# Patient Record
Sex: MALE | Race: BLACK OR AFRICAN AMERICAN | URBAN - METROPOLITAN AREA
[De-identification: names, ages, dates, MRNs, and addresses within clinical notes are randomized per-mention and may not be internally consistent; named-entity substitution may affect disease eponyms.]

---

## 2020-10-07 ENCOUNTER — OFFICE VISIT (OUTPATIENT)
Dept: URBAN - METROPOLITAN AREA CLINIC 105 | Facility: CLINIC | Age: 40
End: 2020-10-07
Payer: COMMERCIAL

## 2020-10-07 DIAGNOSIS — R56.9 SEIZURE: ICD-10-CM

## 2020-10-07 DIAGNOSIS — Z79.01 ANTICOAGULATION ADEQUATE: ICD-10-CM

## 2020-10-07 DIAGNOSIS — D12.6 COLON ADENOMA: ICD-10-CM

## 2020-10-07 DIAGNOSIS — R19.4 CHANGE IN BOWEL HABIT: ICD-10-CM

## 2020-10-07 DIAGNOSIS — K58.1 IRRITABLE BOWEL SYNDROME WITH CONSTIPATION: ICD-10-CM

## 2020-10-07 DIAGNOSIS — R10.9 ABDOMINAL CRAMPING: ICD-10-CM

## 2020-10-07 DIAGNOSIS — E75.21: ICD-10-CM

## 2020-10-07 DIAGNOSIS — Z80.0 FAMILY HISTORY OF COLON CANCER: ICD-10-CM

## 2020-10-07 DIAGNOSIS — E13.9 DIABETES 1.5, MANAGED AS TYPE 2: ICD-10-CM

## 2020-10-07 DIAGNOSIS — K62.5 RECTAL BLEEDING: ICD-10-CM

## 2020-10-07 PROCEDURE — G8417 CALC BMI ABV UP PARAM F/U: HCPCS | Performed by: INTERNAL MEDICINE

## 2020-10-07 PROCEDURE — 1036F TOBACCO NON-USER: CPT | Performed by: INTERNAL MEDICINE

## 2020-10-07 PROCEDURE — 99214 OFFICE O/P EST MOD 30 MIN: CPT | Performed by: INTERNAL MEDICINE

## 2020-10-07 PROCEDURE — G8482 FLU IMMUNIZE ORDER/ADMIN: HCPCS | Performed by: INTERNAL MEDICINE

## 2020-10-07 PROCEDURE — G8430 EC AT DOC MEDREC PT NOT ELIG: HCPCS | Performed by: INTERNAL MEDICINE

## 2020-10-07 RX ORDER — WARFARIN 5 MG/1
TABLET ORAL
Qty: 0 | Refills: 0 | Status: ACTIVE | COMMUNITY
Start: 1900-01-01

## 2020-10-07 RX ORDER — DICYCLOMINE HYDROCHLORIDE 10 MG/1
TAKE 1 CAPSULE BY ORAL ROUTE 3 TIMES A DAY AS NEEDED FOR 30 DAYS CAPSULE ORAL
Qty: 30 | Refills: 0
Start: 2020-05-07

## 2020-10-07 RX ORDER — INSULIN DETEMIR 100 [IU]/ML
INJECT BY SUBCUTANEOUS ROUTE PER PRESCRIBER'S INSTRUCTIONS. INSULIN DOSING REQUIRES INDIVIDUALIZATION INJECTION, SOLUTION SUBCUTANEOUS
Qty: 1 | Refills: 0 | Status: ACTIVE | COMMUNITY
Start: 1900-01-01

## 2020-10-07 RX ORDER — LACOSAMIDE 200 MG/1
TAKE 1 TABLET (200 MG) BY ORAL ROUTE 2 TIMES PER DAY TABLET, FILM COATED ORAL 2
Qty: 0 | Refills: 0 | Status: ACTIVE | COMMUNITY
Start: 1900-01-01

## 2020-10-07 RX ORDER — LORAZEPAM 1 MG/1
TABLET ORAL
Qty: 0 | Refills: 0 | Status: ACTIVE | COMMUNITY
Start: 1900-01-01

## 2020-10-07 RX ORDER — INSULIN ASPART 100 [IU]/ML
INJECT BY SUBCUTANEOUS ROUTE PER PRESCRIBER'S INSTRUCTIONS. INSULIN DOSING REQUIRES INDIVIDUALIZATION INJECTION, SOLUTION INTRAVENOUS; SUBCUTANEOUS
Qty: 1 | Refills: 0 | Status: ACTIVE | COMMUNITY
Start: 1900-01-01

## 2020-10-07 RX ORDER — LUBIPROSTONE 24 UG/1
1 CAPSULE WITH FOOD AND WATER CAPSULE, GELATIN COATED ORAL TWICE A DAY
Qty: 180 CAPSULE | Refills: 0 | OUTPATIENT
Start: 2020-10-07 | End: 2021-01-04

## 2020-10-07 RX ORDER — SULFAMETHOXAZOLE AND TRIMETHOPRIM 200; 40 MG/5ML; MG/5ML
SUSPENSION ORAL
Qty: 0 | Refills: 0 | Status: ACTIVE | COMMUNITY
Start: 1900-01-01

## 2020-10-07 RX ORDER — PREGABALIN 75 MG/1
CAPSULE ORAL
Qty: 0 | Refills: 0 | Status: ACTIVE | COMMUNITY
Start: 1900-01-01

## 2020-10-07 NOTE — HPI-OTHER HISTORIES
40 yo male pt of DR Landrum. Discussed his medical history in detail "Amparo always been healthy and work out regularly". 5 years ago was in and out of hospital for 2 years and he was diagnosed with all the below in [facility]. He was diagnosed with DM in 2016. His last hbaic was 6.1 last week. He is type 2 diabetic. He is for hip surgery in May and had another hip surgery in 1/2020. He is seeing me today for "stomach issues" on going for the past 3 years. He initially thought it was a part of his diabetes. A few weeks ago, he felt his symptoms had become unbearable. Symptoms are worse in am and at night. In the am, he gets severe cramps in the am and pm/ Because of that he gets hypoglycemic. Says sometimes eating helps it in the evening. But from 6 am to 10.30 am it is severe, he can be hunched over the office in the am. Cramping is mid abdominal. He had an appendectomy 6 years ago and had no problems with that. Cramping wakes him up from sleep in the am. He gets relief from abdominal cramping after passing urine which he says he does frequently "3 times an hour". AFter eating, he usually will have a BM. In the past (until 2015) he had a BM once every 2 weeks. and on a good week 3 times a week. Now he has a BM 3 times a day for the past 5 years. Stools are not hard. Least number of BMs is one a day. There is no blood in stool and no fhx of colon CA. He does not use nsaids. He has determined that cramps have nothing to do with BG levels either high or low.  5/7/20 STill having severe abdominal pain. Wife present today. clarifies today to saw he has had blood in stool. He thinks from straining as "I split my butt crack". First time was a few years ago. Occurence once every couple of months. kUB only showing mild fecal loading. He notes a loosening in stool consistency in the last month. He states he has been checked for a UTI multiple times. Says he has had an elevated wbc "they have never been able to find a cause". He has seen hematology and told nothing to be concerned about. He says it has gone from high to moderate as at last draw. wbc 11/2019 is 6. He is taking pantoprazole "that makes it worse".  5/28/20 Says "I feel great!" SAys as soon as his colonoscopy, he felt great and that his stomach was flat. "it has been protruding for 3 years". Discussed CT/EGD/colonoscopy . 10/7/20 Not doing well " my symptoms are back to where they were before the colonoscopy" He is going through a divorce. Has 2 kids.  Is having a BM only twice a week. Abdominal cramps. "miralax is not working". Dicyclomine helps with cramping.

## 2021-01-08 ENCOUNTER — ERX REFILL RESPONSE (OUTPATIENT)
Dept: URBAN - METROPOLITAN AREA CLINIC 105 | Facility: CLINIC | Age: 41
End: 2021-01-08

## 2021-01-08 RX ORDER — LUBIPROSTONE 24 UG/1
1 CAPSULE WITH FOOD AND WATER CAPSULE, GELATIN COATED ORAL TWICE A DAY
Qty: 180 | Refills: 0

## 2021-01-27 ENCOUNTER — ERX REFILL RESPONSE (OUTPATIENT)
Dept: URBAN - METROPOLITAN AREA CLINIC 105 | Facility: CLINIC | Age: 41
End: 2021-01-27

## 2021-01-27 RX ORDER — LUBIPROSTONE 24 UG/1
1 CAPSULE WITH FOOD AND WATER CAPSULE, GELATIN COATED ORAL TWICE A DAY
Qty: 180 | Refills: 0

## 2021-02-03 ENCOUNTER — LAB OUTSIDE AN ENCOUNTER (OUTPATIENT)
Dept: URBAN - METROPOLITAN AREA CLINIC 105 | Facility: CLINIC | Age: 41
End: 2021-02-03

## 2021-02-03 ENCOUNTER — OFFICE VISIT (OUTPATIENT)
Dept: URBAN - METROPOLITAN AREA CLINIC 105 | Facility: CLINIC | Age: 41
End: 2021-02-03
Payer: COMMERCIAL

## 2021-02-03 DIAGNOSIS — E75.21: ICD-10-CM

## 2021-02-03 DIAGNOSIS — D12.6 COLON ADENOMA: ICD-10-CM

## 2021-02-03 DIAGNOSIS — K62.5 RECTAL BLEEDING: ICD-10-CM

## 2021-02-03 DIAGNOSIS — R10.9 ABDOMINAL CRAMPING: ICD-10-CM

## 2021-02-03 DIAGNOSIS — R56.9 SEIZURE: ICD-10-CM

## 2021-02-03 DIAGNOSIS — Z80.0 FAMILY HISTORY OF COLON CANCER: ICD-10-CM

## 2021-02-03 DIAGNOSIS — K58.1 IRRITABLE BOWEL SYNDROME WITH CONSTIPATION: ICD-10-CM

## 2021-02-03 DIAGNOSIS — E13.9 DIABETES 1.5, MANAGED AS TYPE 2: ICD-10-CM

## 2021-02-03 DIAGNOSIS — R19.4 CHANGE IN BOWEL HABIT: ICD-10-CM

## 2021-02-03 DIAGNOSIS — Z79.01 ANTICOAGULATION ADEQUATE: ICD-10-CM

## 2021-02-03 PROCEDURE — G8427 DOCREV CUR MEDS BY ELIG CLIN: HCPCS | Performed by: INTERNAL MEDICINE

## 2021-02-03 PROCEDURE — G8420 CALC BMI NORM PARAMETERS: HCPCS | Performed by: INTERNAL MEDICINE

## 2021-02-03 PROCEDURE — G8482 FLU IMMUNIZE ORDER/ADMIN: HCPCS | Performed by: INTERNAL MEDICINE

## 2021-02-03 PROCEDURE — 4004F PT TOBACCO SCREEN RCVD TLK: CPT | Performed by: INTERNAL MEDICINE

## 2021-02-03 PROCEDURE — 99214 OFFICE O/P EST MOD 30 MIN: CPT | Performed by: INTERNAL MEDICINE

## 2021-02-03 RX ORDER — SULFAMETHOXAZOLE AND TRIMETHOPRIM 200; 40 MG/5ML; MG/5ML
SUSPENSION ORAL
Qty: 0 | Refills: 0 | Status: ACTIVE | COMMUNITY
Start: 1900-01-01

## 2021-02-03 RX ORDER — INSULIN ASPART 100 [IU]/ML
INJECT BY SUBCUTANEOUS ROUTE PER PRESCRIBER'S INSTRUCTIONS. INSULIN DOSING REQUIRES INDIVIDUALIZATION INJECTION, SOLUTION INTRAVENOUS; SUBCUTANEOUS
Qty: 1 | Refills: 0 | Status: ACTIVE | COMMUNITY
Start: 1900-01-01

## 2021-02-03 RX ORDER — LUBIPROSTONE 24 UG/1
1 CAPSULE WITH FOOD AND WATER CAPSULE, GELATIN COATED ORAL TWICE A DAY
Qty: 180

## 2021-02-03 RX ORDER — LUBIPROSTONE 24 UG/1
1 CAPSULE WITH FOOD AND WATER CAPSULE, GELATIN COATED ORAL TWICE A DAY
Qty: 180 | Refills: 0 | Status: ACTIVE | COMMUNITY

## 2021-02-03 RX ORDER — LACOSAMIDE 200 MG/1
TAKE 1 TABLET (200 MG) BY ORAL ROUTE 2 TIMES PER DAY TABLET, FILM COATED ORAL 2
Qty: 0 | Refills: 0 | Status: ACTIVE | COMMUNITY
Start: 1900-01-01

## 2021-02-03 RX ORDER — LORAZEPAM 1 MG/1
TABLET ORAL
Qty: 0 | Refills: 0 | Status: ACTIVE | COMMUNITY
Start: 1900-01-01

## 2021-02-03 RX ORDER — WARFARIN 5 MG/1
TABLET ORAL
Qty: 0 | Refills: 0 | Status: ACTIVE | COMMUNITY
Start: 1900-01-01

## 2021-02-03 RX ORDER — DICYCLOMINE HYDROCHLORIDE 10 MG/1
TAKE 1 CAPSULE BY ORAL ROUTE 3 TIMES A DAY AS NEEDED FOR 30 DAYS CAPSULE ORAL
Qty: 30 | Refills: 0 | Status: ACTIVE | COMMUNITY
Start: 2020-05-07

## 2021-02-03 RX ORDER — PREGABALIN 75 MG/1
CAPSULE ORAL
Qty: 0 | Refills: 0 | Status: ACTIVE | COMMUNITY
Start: 1900-01-01

## 2021-02-03 RX ORDER — DICYCLOMINE HYDROCHLORIDE 10 MG/1
TAKE 1 CAPSULE BY ORAL ROUTE 3 TIMES A DAY AS NEEDED FOR 30 DAYS CAPSULE ORAL
Qty: 30 | Refills: 0

## 2021-02-03 RX ORDER — INSULIN DETEMIR 100 [IU]/ML
INJECT BY SUBCUTANEOUS ROUTE PER PRESCRIBER'S INSTRUCTIONS. INSULIN DOSING REQUIRES INDIVIDUALIZATION INJECTION, SOLUTION SUBCUTANEOUS
Qty: 1 | Refills: 0 | Status: ACTIVE | COMMUNITY
Start: 1900-01-01

## 2021-02-03 NOTE — HPI-OTHER HISTORIES
40 yo male pt of DR Landrum. Discussed his medical history in detail "Amparo always been healthy and work out regularly". 5 years ago was in and out of hospital for 2 years and he was diagnosed with all the below in [facility]. He was diagnosed with DM in 2016. His last hbaic was 6.1 last week. He is type 2 diabetic. He is for hip surgery in May and had another hip surgery in 1/2020. He is seeing me today for "stomach issues" on going for the past 3 years. He initially thought it was a part of his diabetes. A few weeks ago, he felt his symptoms had become unbearable. Symptoms are worse in am and at night. In the am, he gets severe cramps in the am and pm/ Because of that he gets hypoglycemic. Says sometimes eating helps it in the evening. But from 6 am to 10.30 am it is severe, he can be hunched over the office in the am. Cramping is mid abdominal. He had an appendectomy 6 years ago and had no problems with that. Cramping wakes him up from sleep in the am. He gets relief from abdominal cramping after passing urine which he says he does frequently "3 times an hour". AFter eating, he usually will have a BM. In the past (until 2015) he had a BM once every 2 weeks. and on a good week 3 times a week. Now he has a BM 3 times a day for the past 5 years. Stools are not hard. Least number of BMs is one a day. There is no blood in stool and no fhx of colon CA. He does not use nsaids. He has determined that cramps have nothing to do with BG levels either high or low.  5/7/20 STill having severe abdominal pain. Wife present today. clarifies today to saw he has had blood in stool. He thinks from straining as "I split my butt crack". First time was a few years ago. Occurence once every couple of months. kUB only showing mild fecal loading. He notes a loosening in stool consistency in the last month. He states he has been checked for a UTI multiple times. Says he has had an elevated wbc "they have never been able to find a cause". He has seen hematology and told nothing to be concerned about. He says it has gone from high to moderate as at last draw. wbc 11/2019 is 6. He is taking pantoprazole "that makes it worse".  5/28/20 Says "I feel great!" SAys as soon as his colonoscopy, he felt great and that his stomach was flat. "it has been protruding for 3 years". Discussed CT/EGD/colonoscopy . 10/7/20 Not doing well " my symptoms are back to where they were before the colonoscopy" He is going through a divorce. Has 2 kids.  Is having a BM only twice a week. Abdominal cramps. "miralax is not working". Dicyclomine helps with cramping.  2/3/21 Says Amidia was working bid. Says the first week it gave him diarrhed and cramps. Then that resolved and he was having a BM every morning "like clockwork". Now he is back to where he is still having a BM daily but is still gettting stomach cramps. He is taking dicyclomine about qod. This helps the cramping but for a while he was off dicyclomine.  His father has been in hospital now for the past month - first prostate, then colon CA and covid. This has been very stressful

## 2021-02-19 ENCOUNTER — TELEPHONE ENCOUNTER (OUTPATIENT)
Dept: URBAN - METROPOLITAN AREA CLINIC 105 | Facility: CLINIC | Age: 41
End: 2021-02-19

## 2021-03-05 ENCOUNTER — TELEPHONE ENCOUNTER (OUTPATIENT)
Dept: URBAN - METROPOLITAN AREA CLINIC 92 | Facility: CLINIC | Age: 41
End: 2021-03-05

## 2021-03-15 ENCOUNTER — OFFICE VISIT (OUTPATIENT)
Dept: URBAN - METROPOLITAN AREA CLINIC 105 | Facility: CLINIC | Age: 41
End: 2021-03-15
Payer: COMMERCIAL

## 2021-03-15 ENCOUNTER — WEB ENCOUNTER (OUTPATIENT)
Dept: URBAN - METROPOLITAN AREA CLINIC 105 | Facility: CLINIC | Age: 41
End: 2021-03-15

## 2021-03-15 DIAGNOSIS — D12.6 COLON ADENOMA: ICD-10-CM

## 2021-03-15 DIAGNOSIS — K58.1 IRRITABLE BOWEL SYNDROME WITH CONSTIPATION: ICD-10-CM

## 2021-03-15 DIAGNOSIS — E75.21: ICD-10-CM

## 2021-03-15 DIAGNOSIS — R19.4 CHANGE IN BOWEL HABIT: ICD-10-CM

## 2021-03-15 DIAGNOSIS — K62.5 RECTAL BLEEDING: ICD-10-CM

## 2021-03-15 DIAGNOSIS — R56.9 SEIZURE: ICD-10-CM

## 2021-03-15 DIAGNOSIS — E13.9 DIABETES 1.5, MANAGED AS TYPE 2: ICD-10-CM

## 2021-03-15 DIAGNOSIS — Z80.0 FAMILY HISTORY OF COLON CANCER: ICD-10-CM

## 2021-03-15 DIAGNOSIS — Z79.01 ANTICOAGULATION ADEQUATE: ICD-10-CM

## 2021-03-15 DIAGNOSIS — R10.9 ABDOMINAL CRAMPING: ICD-10-CM

## 2021-03-15 PROCEDURE — 99214 OFFICE O/P EST MOD 30 MIN: CPT | Performed by: INTERNAL MEDICINE

## 2021-03-15 RX ORDER — INSULIN ASPART 100 [IU]/ML
INJECT BY SUBCUTANEOUS ROUTE PER PRESCRIBER'S INSTRUCTIONS. INSULIN DOSING REQUIRES INDIVIDUALIZATION INJECTION, SOLUTION INTRAVENOUS; SUBCUTANEOUS
Qty: 1 | Refills: 0 | Status: ACTIVE | COMMUNITY
Start: 1900-01-01

## 2021-03-15 RX ORDER — INSULIN DETEMIR 100 [IU]/ML
INJECT BY SUBCUTANEOUS ROUTE PER PRESCRIBER'S INSTRUCTIONS. INSULIN DOSING REQUIRES INDIVIDUALIZATION INJECTION, SOLUTION SUBCUTANEOUS
Qty: 1 | Refills: 0 | Status: ACTIVE | COMMUNITY
Start: 1900-01-01

## 2021-03-15 RX ORDER — DICYCLOMINE HYDROCHLORIDE 10 MG/1
TAKE 1 CAPSULE BY ORAL ROUTE 3 TIMES A DAY AS NEEDED FOR 30 DAYS CAPSULE ORAL
Qty: 30 | Refills: 0 | Status: ACTIVE | COMMUNITY

## 2021-03-15 RX ORDER — LUBIPROSTONE 24 UG/1
1 CAPSULE WITH FOOD AND WATER CAPSULE, GELATIN COATED ORAL TWICE A DAY
Qty: 180

## 2021-03-15 RX ORDER — LORAZEPAM 1 MG/1
TABLET ORAL
Qty: 0 | Refills: 0 | Status: ACTIVE | COMMUNITY
Start: 1900-01-01

## 2021-03-15 RX ORDER — PREGABALIN 75 MG/1
CAPSULE ORAL
Qty: 0 | Refills: 0 | Status: ACTIVE | COMMUNITY
Start: 1900-01-01

## 2021-03-15 RX ORDER — WARFARIN 5 MG/1
TABLET ORAL
Qty: 0 | Refills: 0 | Status: ACTIVE | COMMUNITY
Start: 1900-01-01

## 2021-03-15 RX ORDER — DICYCLOMINE HYDROCHLORIDE 10 MG/1
TAKE 1 CAPSULE BY ORAL ROUTE 3 TIMES A DAY AS NEEDED FOR 30 DAYS CAPSULE ORAL
Qty: 30 | Refills: 0

## 2021-03-15 RX ORDER — SULFAMETHOXAZOLE AND TRIMETHOPRIM 200; 40 MG/5ML; MG/5ML
SUSPENSION ORAL
Qty: 0 | Refills: 0 | Status: ACTIVE | COMMUNITY
Start: 1900-01-01

## 2021-03-15 RX ORDER — LACOSAMIDE 200 MG/1
TAKE 1 TABLET (200 MG) BY ORAL ROUTE 2 TIMES PER DAY TABLET, FILM COATED ORAL 2
Qty: 0 | Refills: 0 | Status: ACTIVE | COMMUNITY
Start: 1900-01-01

## 2021-03-15 RX ORDER — LUBIPROSTONE 24 UG/1
1 CAPSULE WITH FOOD AND WATER CAPSULE, GELATIN COATED ORAL TWICE A DAY
Qty: 180 | Status: ACTIVE | COMMUNITY

## 2021-03-15 NOTE — HPI-OTHER HISTORIES
38 yo male pt of DR Landrum. Discussed his medical history in detail "Ampaor always been healthy and work out regularly". 5 years ago was in and out of hospital for 2 years and he was diagnosed with all the below in [facility]. He was diagnosed with DM in 2016. His last hbaic was 6.1 last week. He is type 2 diabetic. He is for hip surgery in May and had another hip surgery in 1/2020. He is seeing me today for "stomach issues" on going for the past 3 years. He initially thought it was a part of his diabetes. A few weeks ago, he felt his symptoms had become unbearable. Symptoms are worse in am and at night. In the am, he gets severe cramps in the am and pm/ Because of that he gets hypoglycemic. Says sometimes eating helps it in the evening. But from 6 am to 10.30 am it is severe, he can be hunched over the office in the am. Cramping is mid abdominal. He had an appendectomy 6 years ago and had no problems with that. Cramping wakes him up from sleep in the am. He gets relief from abdominal cramping after passing urine which he says he does frequently "3 times an hour". AFter eating, he usually will have a BM. In the past (until 2015) he had a BM once every 2 weeks. and on a good week 3 times a week. Now he has a BM 3 times a day for the past 5 years. Stools are not hard. Least number of BMs is one a day. There is no blood in stool and no fhx of colon CA. He does not use nsaids. He has determined that cramps have nothing to do with BG levels either high or low.  5/7/20 STill having severe abdominal pain. Wife present today. clarifies today to saw he has had blood in stool. He thinks from straining as "I split my butt crack". First time was a few years ago. Occurence once every couple of months. kUB only showing mild fecal loading. He notes a loosening in stool consistency in the last month. He states he has been checked for a UTI multiple times. Says he has had an elevated wbc "they have never been able to find a cause". He has seen hematology and told nothing to be concerned about. He says it has gone from high to moderate as at last draw. wbc 11/2019 is 6. He is taking pantoprazole "that makes it worse".  5/28/20 Says "I feel great!" SAys as soon as his colonoscopy, he felt great and that his stomach was flat. "it has been protruding for 3 years". Discussed CT/EGD/colonoscopy . 10/7/20 Not doing well " my symptoms are back to where they were before the colonoscopy" He is going through a divorce. Has 2 kids.  Is having a BM only twice a week. Abdominal cramps. "miralax is not working". Dicyclomine helps with cramping.  2/3/21 Says Amitiza was working bid. Says the first week it gave him diarrhed and cramps. Then that resolved and he was having a BM every morning "like clockwork". Now he is back to where he is still having a BM daily but is still gettting stomach cramps. He is taking dicyclomine about qod. This helps the cramping but for a while he was off dicyclomine.  His father has been in hospital now for the past month - first prostate, then colon CA and covid. This has been very stressful  3/15/21 KUB 2/2021 showed mild to moderate stool in colon while on Amitiza . Started on LInzess 145 "up and down".  Says he didnt have a  BM for 2 days then yesterday "spent 2 hours in the bathroom". When he has a  BM "its like diarrhea'. Says "I dont mind that because I can empty out". Wants to continue to try linzess for now.. He still takes dicyclomine prn for cramping.  STates as a child and over 20 yrs had a  BM once a week or once every 2 weeks.

## 2021-04-13 ENCOUNTER — TELEPHONE ENCOUNTER (OUTPATIENT)
Dept: URBAN - METROPOLITAN AREA CLINIC 92 | Facility: CLINIC | Age: 41
End: 2021-04-13

## 2021-04-13 RX ORDER — LINACLOTIDE 145 UG/1
1 CAPSULE AT LEAST 30 MINUTES BEFORE THE FIRST MEAL OF THE DAY ON AN EMPTY STOMACH CAPSULE, GELATIN COATED ORAL ONCE A DAY
Qty: 90 | Refills: 0 | OUTPATIENT
Start: 2021-04-14 | End: 2021-07-13

## 2021-04-13 RX ORDER — INSULIN ASPART 100 [IU]/ML
INJECT BY SUBCUTANEOUS ROUTE PER PRESCRIBER'S INSTRUCTIONS. INSULIN DOSING REQUIRES INDIVIDUALIZATION INJECTION, SOLUTION INTRAVENOUS; SUBCUTANEOUS
Qty: 1 | Refills: 0 | Status: ACTIVE | COMMUNITY
Start: 1900-01-01

## 2021-04-13 RX ORDER — LORAZEPAM 1 MG/1
TABLET ORAL
Qty: 0 | Refills: 0 | Status: ACTIVE | COMMUNITY
Start: 1900-01-01

## 2021-04-13 RX ORDER — WARFARIN 5 MG/1
TABLET ORAL
Qty: 0 | Refills: 0 | Status: ACTIVE | COMMUNITY
Start: 1900-01-01

## 2021-04-13 RX ORDER — INSULIN DETEMIR 100 [IU]/ML
INJECT BY SUBCUTANEOUS ROUTE PER PRESCRIBER'S INSTRUCTIONS. INSULIN DOSING REQUIRES INDIVIDUALIZATION INJECTION, SOLUTION SUBCUTANEOUS
Qty: 1 | Refills: 0 | Status: ACTIVE | COMMUNITY
Start: 1900-01-01

## 2021-04-13 RX ORDER — DICYCLOMINE HYDROCHLORIDE 10 MG/1
TAKE 1 CAPSULE BY ORAL ROUTE 3 TIMES A DAY AS NEEDED FOR 30 DAYS CAPSULE ORAL
Qty: 30 | Refills: 0 | Status: ACTIVE | COMMUNITY

## 2021-04-13 RX ORDER — SULFAMETHOXAZOLE AND TRIMETHOPRIM 200; 40 MG/5ML; MG/5ML
SUSPENSION ORAL
Qty: 0 | Refills: 0 | Status: ACTIVE | COMMUNITY
Start: 1900-01-01

## 2021-04-13 RX ORDER — LUBIPROSTONE 24 UG/1
1 CAPSULE WITH FOOD AND WATER CAPSULE, GELATIN COATED ORAL TWICE A DAY
Qty: 180 | Status: ACTIVE | COMMUNITY

## 2021-04-13 RX ORDER — LACOSAMIDE 200 MG/1
TAKE 1 TABLET (200 MG) BY ORAL ROUTE 2 TIMES PER DAY TABLET, FILM COATED ORAL 2
Qty: 0 | Refills: 0 | Status: ACTIVE | COMMUNITY
Start: 1900-01-01

## 2021-04-13 RX ORDER — PREGABALIN 75 MG/1
CAPSULE ORAL
Qty: 0 | Refills: 0 | Status: ACTIVE | COMMUNITY
Start: 1900-01-01

## 2021-05-03 ENCOUNTER — OFFICE VISIT (OUTPATIENT)
Dept: URBAN - METROPOLITAN AREA CLINIC 105 | Facility: CLINIC | Age: 41
End: 2021-05-03

## 2022-01-06 ENCOUNTER — TELEPHONE ENCOUNTER (OUTPATIENT)
Dept: URBAN - METROPOLITAN AREA CLINIC 105 | Facility: CLINIC | Age: 42
End: 2022-01-06

## 2022-01-26 ENCOUNTER — OFFICE VISIT (OUTPATIENT)
Dept: URBAN - METROPOLITAN AREA CLINIC 105 | Facility: CLINIC | Age: 42
End: 2022-01-26
Payer: COMMERCIAL

## 2022-01-26 ENCOUNTER — LAB OUTSIDE AN ENCOUNTER (OUTPATIENT)
Dept: URBAN - METROPOLITAN AREA CLINIC 105 | Facility: CLINIC | Age: 42
End: 2022-01-26

## 2022-01-26 VITALS
SYSTOLIC BLOOD PRESSURE: 116 MMHG | TEMPERATURE: 96.8 F | HEIGHT: 71 IN | WEIGHT: 202.4 LBS | HEART RATE: 75 BPM | DIASTOLIC BLOOD PRESSURE: 81 MMHG | BODY MASS INDEX: 28.34 KG/M2

## 2022-01-26 DIAGNOSIS — R10.9 ABDOMINAL CRAMPING: ICD-10-CM

## 2022-01-26 DIAGNOSIS — I25.2 STATUS POST MYOCARDIAL INFARCTION: ICD-10-CM

## 2022-01-26 DIAGNOSIS — Z80.0 FAMILY HISTORY OF COLON CANCER: ICD-10-CM

## 2022-01-26 DIAGNOSIS — K62.5 RECTAL BLEEDING: ICD-10-CM

## 2022-01-26 DIAGNOSIS — E13.9 DIABETES 1.5, MANAGED AS TYPE 2: ICD-10-CM

## 2022-01-26 DIAGNOSIS — Z79.01 ANTICOAGULATION ADEQUATE: ICD-10-CM

## 2022-01-26 DIAGNOSIS — E75.21: ICD-10-CM

## 2022-01-26 DIAGNOSIS — K58.1 IRRITABLE BOWEL SYNDROME WITH CONSTIPATION: ICD-10-CM

## 2022-01-26 DIAGNOSIS — R19.4 CHANGE IN BOWEL HABIT: ICD-10-CM

## 2022-01-26 DIAGNOSIS — R56.9 SEIZURE: ICD-10-CM

## 2022-01-26 DIAGNOSIS — D12.6 COLON ADENOMA: ICD-10-CM

## 2022-01-26 PROCEDURE — 99213 OFFICE O/P EST LOW 20 MIN: CPT | Performed by: INTERNAL MEDICINE

## 2022-01-26 RX ORDER — LACOSAMIDE 200 MG/1
TAKE 1 TABLET (200 MG) BY ORAL ROUTE 2 TIMES PER DAY TABLET, FILM COATED ORAL 2
Qty: 0 | Refills: 0 | Status: ACTIVE | COMMUNITY
Start: 1900-01-01

## 2022-01-26 RX ORDER — LACTULOSE 10 G/15ML
15 ML SOLUTION ORAL ONCE A DAY
Qty: 1350 ML | Refills: 0 | OUTPATIENT
Start: 2022-01-26 | End: 2022-04-26

## 2022-01-26 RX ORDER — LUBIPROSTONE 24 UG/1
1 CAPSULE WITH FOOD AND WATER CAPSULE, GELATIN COATED ORAL TWICE A DAY
Qty: 180 | Status: ACTIVE | COMMUNITY

## 2022-01-26 RX ORDER — DICYCLOMINE HYDROCHLORIDE 10 MG/1
TAKE 1 CAPSULE BY ORAL ROUTE 3 TIMES A DAY AS NEEDED FOR 30 DAYS CAPSULE ORAL
Qty: 270 | Refills: 0

## 2022-01-26 RX ORDER — LORAZEPAM 1 MG/1
TABLET ORAL
Qty: 0 | Refills: 0 | Status: ACTIVE | COMMUNITY
Start: 1900-01-01

## 2022-01-26 RX ORDER — SULFAMETHOXAZOLE AND TRIMETHOPRIM 200; 40 MG/5ML; MG/5ML
SUSPENSION ORAL
Qty: 0 | Refills: 0 | Status: ACTIVE | COMMUNITY
Start: 1900-01-01

## 2022-01-26 RX ORDER — PREGABALIN 75 MG/1
CAPSULE ORAL
Qty: 0 | Refills: 0 | Status: ACTIVE | COMMUNITY
Start: 1900-01-01

## 2022-01-26 RX ORDER — WARFARIN 5 MG/1
TABLET ORAL
Qty: 0 | Refills: 0 | Status: ACTIVE | COMMUNITY
Start: 1900-01-01

## 2022-01-26 RX ORDER — DICYCLOMINE HYDROCHLORIDE 10 MG/1
TAKE 1 CAPSULE BY ORAL ROUTE 3 TIMES A DAY AS NEEDED FOR 30 DAYS CAPSULE ORAL
Qty: 30 | Refills: 0 | Status: ACTIVE | COMMUNITY

## 2022-01-26 RX ORDER — INSULIN DETEMIR 100 [IU]/ML
INJECT BY SUBCUTANEOUS ROUTE PER PRESCRIBER'S INSTRUCTIONS. INSULIN DOSING REQUIRES INDIVIDUALIZATION INJECTION, SOLUTION SUBCUTANEOUS
Qty: 1 | Refills: 0 | Status: ACTIVE | COMMUNITY
Start: 1900-01-01

## 2022-01-26 RX ORDER — INSULIN ASPART 100 [IU]/ML
INJECT BY SUBCUTANEOUS ROUTE PER PRESCRIBER'S INSTRUCTIONS. INSULIN DOSING REQUIRES INDIVIDUALIZATION INJECTION, SOLUTION INTRAVENOUS; SUBCUTANEOUS
Qty: 1 | Refills: 0 | Status: ACTIVE | COMMUNITY
Start: 1900-01-01

## 2022-01-26 NOTE — HPI-OTHER HISTORIES
40 yo male pt of DR Landrum. Discussed his medical history in detail "Amparo always been healthy and work out regularly". 5 years ago was in and out of hospital for 2 years and he was diagnosed with all the below in [facility]. He was diagnosed with DM in 2016. His last hbaic was 6.1 last week. He is type 2 diabetic. He is for hip surgery in May and had another hip surgery in 1/2020. He is seeing me today for "stomach issues" on going for the past 3 years. He initially thought it was a part of his diabetes. A few weeks ago, he felt his symptoms had become unbearable. Symptoms are worse in am and at night. In the am, he gets severe cramps in the am and pm/ Because of that he gets hypoglycemic. Says sometimes eating helps it in the evening. But from 6 am to 10.30 am it is severe, he can be hunched over the office in the am. Cramping is mid abdominal. He had an appendectomy 6 years ago and had no problems with that. Cramping wakes him up from sleep in the am. He gets relief from abdominal cramping after passing urine which he says he does frequently "3 times an hour". AFter eating, he usually will have a BM. In the past (until 2015) he had a BM once every 2 weeks. and on a good week 3 times a week. Now he has a BM 3 times a day for the past 5 years. Stools are not hard. Least number of BMs is one a day. There is no blood in stool and no fhx of colon CA. He does not use nsaids. He has determined that cramps have nothing to do with BG levels either high or low.  5/7/20 STill having severe abdominal pain. Wife present today. clarifies today to saw he has had blood in stool. He thinks from straining as "I split my butt crack". First time was a few years ago. Occurence once every couple of months. kUB only showing mild fecal loading. He notes a loosening in stool consistency in the last month. He states he has been checked for a UTI multiple times. Says he has had an elevated wbc "they have never been able to find a cause". He has seen hematology and told nothing to be concerned about. He says it has gone from high to moderate as at last draw. wbc 11/2019 is 6. He is taking pantoprazole "that makes it worse".  5/28/20 Says "I feel great!" SAys as soon as his colonoscopy, he felt great and that his stomach was flat. "it has been protruding for 3 years". Discussed CT/EGD/colonoscopy . 10/7/20 Not doing well " my symptoms are back to where they were before the colonoscopy" He is going through a divorce. Has 2 kids.  Is having a BM only twice a week. Abdominal cramps. "miralax is not working". Dicyclomine helps with cramping.  2/3/21 Says Amitiza was working bid. Says the first week it gave him diarrhed and cramps. Then that resolved and he was having a BM every morning "like clockwork". Now he is back to where he is still having a BM daily but is still gettting stomach cramps. He is taking dicyclomine about qod. This helps the cramping but for a while he was off dicyclomine.  His father has been in hospital now for the past month - first prostate, then colon CA and covid. This has been very stressful  3/15/21 KUB 2/2021 showed mild to moderate stool in colon while on Amitiza . Started on LInzess 145 "up and down".  Says he didnt have a  BM for 2 days then yesterday "spent 2 hours in the bathroom". When he has a  BM "its like diarrhea'. Says "I dont mind that because I can empty out". Wants to continue to try linzess for now.. He still takes dicyclomine prn for cramping.  STates as a child and over 20 yrs had a  BM once a week or once every 2 weeks.   1/26/22 Here for f/u visit SAys linzess works much better than AMitiza but his insurance will not cover it.  He is open to trying Lactulose.  He also has problems with linzess - when he takes it daily he has only small amounts of BMs daily. When he takes qod, he feels emptied out but will have severe cramping. He agrees this is not optimal.

## 2022-02-03 ENCOUNTER — TELEPHONE ENCOUNTER (OUTPATIENT)
Dept: URBAN - METROPOLITAN AREA CLINIC 17 | Facility: CLINIC | Age: 42
End: 2022-02-03

## 2022-02-03 RX ORDER — LACTULOSE 10 G/15ML
15 ML SOLUTION ORAL ONCE A DAY
Qty: 1350 ML | Refills: 0
Start: 2022-01-26 | End: 2022-05-04

## 2022-08-15 ENCOUNTER — TELEPHONE ENCOUNTER (OUTPATIENT)
Dept: URBAN - METROPOLITAN AREA CLINIC 105 | Facility: CLINIC | Age: 42
End: 2022-08-15

## 2022-08-18 ENCOUNTER — OFFICE VISIT (OUTPATIENT)
Dept: URBAN - METROPOLITAN AREA CLINIC 105 | Facility: CLINIC | Age: 42
End: 2022-08-18
Payer: COMMERCIAL

## 2022-08-18 ENCOUNTER — WEB ENCOUNTER (OUTPATIENT)
Dept: URBAN - METROPOLITAN AREA CLINIC 105 | Facility: CLINIC | Age: 42
End: 2022-08-18

## 2022-08-18 VITALS
HEART RATE: 64 BPM | DIASTOLIC BLOOD PRESSURE: 91 MMHG | SYSTOLIC BLOOD PRESSURE: 131 MMHG | WEIGHT: 202.6 LBS | TEMPERATURE: 97.2 F | BODY MASS INDEX: 28.36 KG/M2 | HEIGHT: 71 IN

## 2022-08-18 DIAGNOSIS — I25.2 STATUS POST MYOCARDIAL INFARCTION: ICD-10-CM

## 2022-08-18 DIAGNOSIS — R56.9 SEIZURE: ICD-10-CM

## 2022-08-18 DIAGNOSIS — D12.6 COLON ADENOMA: ICD-10-CM

## 2022-08-18 DIAGNOSIS — E75.21: ICD-10-CM

## 2022-08-18 DIAGNOSIS — Z79.01 ANTICOAGULATION ADEQUATE: ICD-10-CM

## 2022-08-18 DIAGNOSIS — E13.9 DIABETES 1.5, MANAGED AS TYPE 2: ICD-10-CM

## 2022-08-18 DIAGNOSIS — K58.1 IRRITABLE BOWEL SYNDROME WITH CONSTIPATION: ICD-10-CM

## 2022-08-18 DIAGNOSIS — R19.4 CHANGE IN BOWEL HABIT: ICD-10-CM

## 2022-08-18 DIAGNOSIS — K62.5 RECTAL BLEEDING: ICD-10-CM

## 2022-08-18 DIAGNOSIS — Z80.0 FAMILY HISTORY OF COLON CANCER: ICD-10-CM

## 2022-08-18 DIAGNOSIS — R10.9 ABDOMINAL CRAMPING: ICD-10-CM

## 2022-08-18 PROBLEM — 426875007: Status: ACTIVE | Noted: 2020-10-07

## 2022-08-18 PROBLEM — 365634009: Status: ACTIVE | Noted: 2020-10-07

## 2022-08-18 PROBLEM — 399211009: Status: ACTIVE | Noted: 2022-01-26

## 2022-08-18 PROBLEM — 440630006: Status: ACTIVE | Noted: 2020-10-07

## 2022-08-18 PROBLEM — 10741005: Status: ACTIVE | Noted: 2022-01-26

## 2022-08-18 PROCEDURE — 99214 OFFICE O/P EST MOD 30 MIN: CPT | Performed by: INTERNAL MEDICINE

## 2022-08-18 RX ORDER — INSULIN ASPART 100 [IU]/ML
INJECT BY SUBCUTANEOUS ROUTE PER PRESCRIBER'S INSTRUCTIONS. INSULIN DOSING REQUIRES INDIVIDUALIZATION INJECTION, SOLUTION INTRAVENOUS; SUBCUTANEOUS
Qty: 1 | Refills: 0 | Status: ACTIVE | COMMUNITY
Start: 1900-01-01

## 2022-08-18 RX ORDER — LACOSAMIDE 200 MG/1
TAKE 1 TABLET (200 MG) BY ORAL ROUTE 2 TIMES PER DAY TABLET, FILM COATED ORAL 2
Qty: 0 | Refills: 0 | Status: ACTIVE | COMMUNITY
Start: 1900-01-01

## 2022-08-18 RX ORDER — WARFARIN 5 MG/1
TABLET ORAL
Qty: 0 | Refills: 0 | Status: ACTIVE | COMMUNITY
Start: 1900-01-01

## 2022-08-18 RX ORDER — ATORVASTATIN CALCIUM 40 MG/1
1 TABLET TABLET, FILM COATED ORAL ONCE A DAY
Qty: 30 | Status: ACTIVE | COMMUNITY
Start: 2022-08-18

## 2022-08-18 RX ORDER — INSULIN DETEMIR 100 [IU]/ML
INJECT BY SUBCUTANEOUS ROUTE PER PRESCRIBER'S INSTRUCTIONS. INSULIN DOSING REQUIRES INDIVIDUALIZATION INJECTION, SOLUTION SUBCUTANEOUS
Qty: 1 | Refills: 0 | Status: ACTIVE | COMMUNITY
Start: 1900-01-01

## 2022-08-18 RX ORDER — LORAZEPAM 1 MG/1
TABLET ORAL
Qty: 0 | Refills: 0 | Status: ACTIVE | COMMUNITY
Start: 1900-01-01

## 2022-08-18 RX ORDER — LUBIPROSTONE 24 UG/1
1 CAPSULE WITH FOOD AND WATER CAPSULE, GELATIN COATED ORAL TWICE A DAY
Qty: 180 | Status: ACTIVE | COMMUNITY

## 2022-08-18 RX ORDER — DICYCLOMINE HYDROCHLORIDE 10 MG/1
TAKE 1 CAPSULE BY ORAL ROUTE 3 TIMES A DAY AS NEEDED FOR 30 DAYS CAPSULE ORAL
Qty: 270 | Refills: 0 | Status: ACTIVE | COMMUNITY

## 2022-08-18 RX ORDER — LACOSAMIDE 100 MG/1
1 TABLET TABLET, FILM COATED ORAL TWICE A DAY
Status: ACTIVE | COMMUNITY

## 2022-08-18 RX ORDER — SULFAMETHOXAZOLE AND TRIMETHOPRIM 200; 40 MG/5ML; MG/5ML
SUSPENSION ORAL
Qty: 0 | Refills: 0 | Status: ACTIVE | COMMUNITY
Start: 1900-01-01

## 2022-08-18 RX ORDER — LACTULOSE 10 G/15ML
15 ML SOLUTION ORAL THREE TIMES A DAY
Qty: 1350 ML | Refills: 6 | OUTPATIENT
Start: 2022-08-18 | End: 2023-03-16

## 2022-08-18 RX ORDER — PREGABALIN 75 MG/1
CAPSULE ORAL
Qty: 0 | Refills: 0 | Status: ACTIVE | COMMUNITY
Start: 1900-01-01

## 2022-08-18 NOTE — HPI-OTHER HISTORIES
38 yo male pt of DR Landrum. Discussed his medical history in detail "Amparo always been healthy and work out regularly". 5 years ago was in and out of hospital for 2 years and he was diagnosed with all the below in [facility]. He was diagnosed with DM in 2016. His last hbaic was 6.1 last week. He is type 2 diabetic. He is for hip surgery in May and had another hip surgery in 1/2020. He is seeing me today for "stomach issues" on going for the past 3 years. He initially thought it was a part of his diabetes. A few weeks ago, he felt his symptoms had become unbearable. Symptoms are worse in am and at night. In the am, he gets severe cramps in the am and pm/ Because of that he gets hypoglycemic. Says sometimes eating helps it in the evening. But from 6 am to 10.30 am it is severe, he can be hunched over the office in the am. Cramping is mid abdominal. He had an appendectomy 6 years ago and had no problems with that. Cramping wakes him up from sleep in the am. He gets relief from abdominal cramping after passing urine which he says he does frequently "3 times an hour". AFter eating, he usually will have a BM. In the past (until 2015) he had a BM once every 2 weeks. and on a good week 3 times a week. Now he has a BM 3 times a day for the past 5 years. Stools are not hard. Least number of BMs is one a day. There is no blood in stool and no fhx of colon CA. He does not use nsaids. He has determined that cramps have nothing to do with BG levels either high or low.  5/7/20 STill having severe abdominal pain. Wife present today. clarifies today to saw he has had blood in stool. He thinks from straining as "I split my butt crack". First time was a few years ago. Occurence once every couple of months. kUB only showing mild fecal loading. He notes a loosening in stool consistency in the last month. He states he has been checked for a UTI multiple times. Says he has had an elevated wbc "they have never been able to find a cause". He has seen hematology and told nothing to be concerned about. He says it has gone from high to moderate as at last draw. wbc 11/2019 is 6. He is taking pantoprazole "that makes it worse".  5/28/20 Says "I feel great!" SAys as soon as his colonoscopy, he felt great and that his stomach was flat. "it has been protruding for 3 years". Discussed CT/EGD/colonoscopy . 10/7/20 Not doing well " my symptoms are back to where they were before the colonoscopy" He is going through a divorce. Has 2 kids.  Is having a BM only twice a week. Abdominal cramps. "miralax is not working". Dicyclomine helps with cramping.  2/3/21 Says Amitiza was working bid. Says the first week it gave him diarrhed and cramps. Then that resolved and he was having a BM every morning "like clockwork". Now he is back to where he is still having a BM daily but is still gettting stomach cramps. He is taking dicyclomine about qod. This helps the cramping but for a while he was off dicyclomine.  His father has been in hospital now for the past month - first prostate, then colon CA and covid. This has been very stressful  3/15/21 KUB 2/2021 showed mild to moderate stool in colon while on Amitiza . Started on LInzess 145 "up and down".  Says he didnt have a  BM for 2 days then yesterday "spent 2 hours in the bathroom". When he has a  BM "its like diarrhea'. Says "I dont mind that because I can empty out". Wants to continue to try linzess for now.. He still takes dicyclomine prn for cramping.  STates as a child and over 20 yrs had a  BM once a week or once every 2 weeks.   1/26/22 Here for f/u visit SAys linzess works much better than AMitiza but his insurance will not cover it.  He is open to trying Lactulose.  He also has problems with linzess - when he takes it daily he has only small amounts of BMs daily. When he takes qod, he feels emptied out but will have severe cramping. He agrees this is not optimal.  8/18/22 Here for f.u visit Taking lactulose - once a day - was working at first. Says linzess worked great but says "problem it wasnt  covered by insurance" Then lactulose stopped working even when he doubled the dose.  Will switch back to [employer] in October for better drug coverage  Has increased his water to a gallon a day and if essence will have a BM qod  Has a protruding belly due to constipation. With abd cramping when he does not have a BM .

## 2022-11-18 ENCOUNTER — OFFICE VISIT (OUTPATIENT)
Dept: URBAN - METROPOLITAN AREA CLINIC 105 | Facility: CLINIC | Age: 42
End: 2022-11-18

## 2022-11-18 RX ORDER — LACTULOSE 10 G/15ML
15 ML SOLUTION ORAL THREE TIMES A DAY
Qty: 1350 ML | Refills: 6 | Status: ACTIVE | COMMUNITY
Start: 2022-08-18 | End: 2023-03-16

## 2022-11-18 RX ORDER — LORAZEPAM 1 MG/1
TABLET ORAL
Qty: 0 | Refills: 0 | Status: ACTIVE | COMMUNITY
Start: 1900-01-01

## 2022-11-18 RX ORDER — SULFAMETHOXAZOLE AND TRIMETHOPRIM 200; 40 MG/5ML; MG/5ML
SUSPENSION ORAL
Qty: 0 | Refills: 0 | Status: ACTIVE | COMMUNITY
Start: 1900-01-01

## 2022-11-18 RX ORDER — ATORVASTATIN CALCIUM 40 MG/1
1 TABLET TABLET, FILM COATED ORAL ONCE A DAY
Qty: 30 | Status: ACTIVE | COMMUNITY
Start: 2022-08-18

## 2022-11-18 RX ORDER — INSULIN ASPART 100 [IU]/ML
INJECT BY SUBCUTANEOUS ROUTE PER PRESCRIBER'S INSTRUCTIONS. INSULIN DOSING REQUIRES INDIVIDUALIZATION INJECTION, SOLUTION INTRAVENOUS; SUBCUTANEOUS
Qty: 1 | Refills: 0 | Status: ACTIVE | COMMUNITY
Start: 1900-01-01

## 2022-11-18 RX ORDER — LUBIPROSTONE 24 UG/1
1 CAPSULE WITH FOOD AND WATER CAPSULE, GELATIN COATED ORAL TWICE A DAY
Qty: 180 | Status: ACTIVE | COMMUNITY

## 2022-11-18 RX ORDER — LACOSAMIDE 100 MG/1
1 TABLET TABLET, FILM COATED ORAL TWICE A DAY
Status: ACTIVE | COMMUNITY

## 2022-11-18 RX ORDER — INSULIN DETEMIR 100 [IU]/ML
INJECT BY SUBCUTANEOUS ROUTE PER PRESCRIBER'S INSTRUCTIONS. INSULIN DOSING REQUIRES INDIVIDUALIZATION INJECTION, SOLUTION SUBCUTANEOUS
Qty: 1 | Refills: 0 | Status: ACTIVE | COMMUNITY
Start: 1900-01-01

## 2022-11-18 RX ORDER — LACOSAMIDE 200 MG/1
TAKE 1 TABLET (200 MG) BY ORAL ROUTE 2 TIMES PER DAY TABLET, FILM COATED ORAL 2
Qty: 0 | Refills: 0 | Status: ACTIVE | COMMUNITY
Start: 1900-01-01

## 2022-11-18 RX ORDER — WARFARIN 5 MG/1
TABLET ORAL
Qty: 0 | Refills: 0 | Status: ACTIVE | COMMUNITY
Start: 1900-01-01

## 2022-11-18 RX ORDER — PREGABALIN 75 MG/1
CAPSULE ORAL
Qty: 0 | Refills: 0 | Status: ACTIVE | COMMUNITY
Start: 1900-01-01

## 2022-11-18 RX ORDER — DICYCLOMINE HYDROCHLORIDE 10 MG/1
TAKE 1 CAPSULE BY ORAL ROUTE 3 TIMES A DAY AS NEEDED FOR 30 DAYS CAPSULE ORAL
Qty: 270 | Refills: 0 | Status: ACTIVE | COMMUNITY

## 2022-12-01 ENCOUNTER — OFFICE VISIT (OUTPATIENT)
Dept: URBAN - METROPOLITAN AREA MEDICAL CENTER 28 | Facility: MEDICAL CENTER | Age: 42
End: 2022-12-01

## 2023-01-13 ENCOUNTER — OFFICE VISIT (OUTPATIENT)
Dept: URBAN - METROPOLITAN AREA CLINIC 105 | Facility: CLINIC | Age: 43
End: 2023-01-13

## 2023-07-06 ENCOUNTER — WEB ENCOUNTER (OUTPATIENT)
Dept: URBAN - METROPOLITAN AREA CLINIC 19 | Facility: CLINIC | Age: 43
End: 2023-07-06

## 2023-07-06 ENCOUNTER — LAB OUTSIDE AN ENCOUNTER (OUTPATIENT)
Dept: URBAN - METROPOLITAN AREA CLINIC 19 | Facility: CLINIC | Age: 43
End: 2023-07-06

## 2023-07-06 ENCOUNTER — OFFICE VISIT (OUTPATIENT)
Dept: URBAN - METROPOLITAN AREA CLINIC 19 | Facility: CLINIC | Age: 43
End: 2023-07-06
Payer: COMMERCIAL

## 2023-07-06 VITALS
TEMPERATURE: 97.2 F | HEIGHT: 71 IN | WEIGHT: 191.2 LBS | BODY MASS INDEX: 26.77 KG/M2 | SYSTOLIC BLOOD PRESSURE: 100 MMHG | DIASTOLIC BLOOD PRESSURE: 78 MMHG

## 2023-07-06 DIAGNOSIS — R14.0 ABDOMINAL DISTENTION: ICD-10-CM

## 2023-07-06 DIAGNOSIS — Z80.0 FAMILY HISTORY OF COLON CANCER: ICD-10-CM

## 2023-07-06 DIAGNOSIS — K59.01 SLOW TRANSIT CONSTIPATION: ICD-10-CM

## 2023-07-06 DIAGNOSIS — E13.9 DIABETES 1.5, MANAGED AS TYPE 2: ICD-10-CM

## 2023-07-06 PROBLEM — 312824007: Status: ACTIVE | Noted: 2023-07-06

## 2023-07-06 PROBLEM — 35298007: Status: ACTIVE | Noted: 2023-07-06

## 2023-07-06 PROBLEM — 60728008: Status: ACTIVE | Noted: 2023-07-06

## 2023-07-06 PROCEDURE — 99214 OFFICE O/P EST MOD 30 MIN: CPT | Performed by: STUDENT IN AN ORGANIZED HEALTH CARE EDUCATION/TRAINING PROGRAM

## 2023-07-06 RX ORDER — POLYETHYLENE GLYCOL 3350, SODIUM SULFATE ANHYDROUS, SODIUM BICARBONATE, SODIUM CHLORIDE, POTASSIUM CHLORIDE 236; 22.74; 6.74; 5.86; 2.97 G/4L; G/4L; G/4L; G/4L; G/4L
AS DIRECTED POWDER, FOR SOLUTION ORAL
OUTPATIENT
Start: 2023-07-06

## 2023-07-06 RX ORDER — SULFAMETHOXAZOLE AND TRIMETHOPRIM 200; 40 MG/5ML; MG/5ML
SUSPENSION ORAL
Qty: 0 | Refills: 0 | Status: ACTIVE | COMMUNITY
Start: 1900-01-01

## 2023-07-06 RX ORDER — LORAZEPAM 1 MG/1
TABLET ORAL
Qty: 0 | Refills: 0 | Status: ACTIVE | COMMUNITY
Start: 1900-01-01

## 2023-07-06 RX ORDER — INSULIN DETEMIR 100 [IU]/ML
INJECT BY SUBCUTANEOUS ROUTE PER PRESCRIBER'S INSTRUCTIONS. INSULIN DOSING REQUIRES INDIVIDUALIZATION INJECTION, SOLUTION SUBCUTANEOUS
Qty: 1 | Refills: 0 | Status: ACTIVE | COMMUNITY
Start: 1900-01-01

## 2023-07-06 RX ORDER — LACOSAMIDE 200 MG/1
TAKE 1 TABLET (200 MG) BY ORAL ROUTE 2 TIMES PER DAY TABLET, FILM COATED ORAL 2
Qty: 0 | Refills: 0 | Status: ACTIVE | COMMUNITY
Start: 1900-01-01

## 2023-07-06 RX ORDER — WARFARIN 5 MG/1
TABLET ORAL
Qty: 0 | Refills: 0 | Status: ACTIVE | COMMUNITY
Start: 1900-01-01

## 2023-07-06 RX ORDER — ATORVASTATIN CALCIUM 40 MG/1
1 TABLET TABLET, FILM COATED ORAL ONCE A DAY
Qty: 30 | Status: ACTIVE | COMMUNITY
Start: 2022-08-18

## 2023-07-06 RX ORDER — PREGABALIN 75 MG/1
CAPSULE ORAL
Qty: 0 | Refills: 0 | Status: ACTIVE | COMMUNITY
Start: 1900-01-01

## 2023-07-06 RX ORDER — INSULIN ASPART 100 [IU]/ML
INJECT BY SUBCUTANEOUS ROUTE PER PRESCRIBER'S INSTRUCTIONS. INSULIN DOSING REQUIRES INDIVIDUALIZATION INJECTION, SOLUTION INTRAVENOUS; SUBCUTANEOUS
Qty: 1 | Refills: 0 | Status: ACTIVE | COMMUNITY
Start: 1900-01-01

## 2023-07-06 RX ORDER — DICYCLOMINE HYDROCHLORIDE 10 MG/1
TAKE 1 CAPSULE BY ORAL ROUTE 3 TIMES A DAY AS NEEDED FOR 30 DAYS CAPSULE ORAL
Qty: 270 | Refills: 0 | Status: ACTIVE | COMMUNITY

## 2023-07-06 RX ORDER — LUBIPROSTONE 24 UG/1
1 CAPSULE WITH FOOD AND WATER CAPSULE, GELATIN COATED ORAL TWICE A DAY
Qty: 180 | Status: ACTIVE | COMMUNITY

## 2023-07-06 RX ORDER — LACOSAMIDE 100 MG/1
1 TABLET TABLET, FILM COATED ORAL TWICE A DAY
Status: ACTIVE | COMMUNITY

## 2023-07-06 NOTE — HPI-OTHER HISTORIES
7/06/2023:  Priscilla 44 yo M with long-standing h/o constipation for which he has seen Dr. Ramírez previously presents for abdominal pain.  Has been on conservative measures, miralax, amitiza, linzess and lactulose when his insurance did not cover linzess.    Was in the [facility] last week.  Was told he may need an endoscopy.    Last colonoscopy was in June 2020 and did not show any concerns other than finding a TA.  Had an EGD at the same time which showed no evidence of H. pylori.  His concerns today are as follows: Severe abdominal pain, cramping with distention and constipation.  BM 1/2 x/week.  Nausea and symptoms x 1 week.  He reports no urge to go.  Brother diagnosed with colon cancer and passed away in the past year, diagnosed at close to death around age 43 years.  Father is currently dying from prostate cancer.  Lactulose does not work and he has been taking amitiza recently. But they have not worked well for him.  Hematochezia 2 weeks ago.  Lost about 12 lbs unintentionally about 2 months ago.  He is regaining that weight.    Last colonoscopy was in 2019 with 5 year f/u if no issues.    + h/o fabry's disease.  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 ================== Previous history from Dr. Tena Ramírez:  40 yo male pt of DR Landrum. Discussed his medical history in detail "I've always been healthy and work out regularly". 5 years ago was in and out of hospital for 2 years and he was diagnosed with all the below in [facility]. He was diagnosed with DM in 2016. His last hba1c was 6.1 last week. He is type 2 diabetic. He is for hip surgery in May and had another hip surgery in 1/2020. He is seeing me today for "stomach issues" on going for the past 3 years. He initially thought it was a part of his diabetes. A few weeks ago, he felt his symptoms had become unbearable. Symptoms are worse in am and at night. In the am, he gets severe cramps in the am and pm/ Because of that he gets hypoglycemic. Says sometimes eating helps it in the evening. But from 6 am to 10.30 am it is severe, he can be hunched over the office in the am. Cramping is mid abdominal. He had an appendectomy 6 years ago and had no problems with that. Cramping wakes him up from sleep in the am. He gets relief from abdominal cramping after passing urine which he says he does frequently "3 times an hour". AFter eating, he usually will have a BM. In the past (until 2015) he had a BM once every 2 weeks. and on a good week 3 times a week. Now he has a BM 3 times a day for the past 5 years. Stools are not hard. Least number of BMs is one a day. There is no blood in stool and no fhx of colon CA. He does not use nsaids. He has determined that cramps have nothing to do with BG levels either high or low.  5/7/20 STill having severe abdominal pain. Wife present today. clarifies today to saw he has had blood in stool. He thinks from straining as "I split my butt crack". First time was a few years ago. Occurence once every couple of months. kUB only showing mild fecal loading. He notes a loosening in stool consistency in the last month. He states he has been checked for a UTI multiple times. Says he has had an elevated wbc "they have never been able to find a cause". He has seen hematology and told nothing to be concerned about. He says it has gone from high to moderate as at last draw. wbc 11/2019 is 6. He is taking pantoprazole "that makes it worse".  5/28/20 Says "I feel great!" SAys as soon as his colonoscopy, he felt great and that his stomach was flat. "it has been protruding for 3 years". Discussed CT/EGD/colonoscopy . 10/7/20 Not doing well " my symptoms are back to where they were before the colonoscopy" He is going through a divorce. Has 2 kids.  Is having a BM only twice a week. Abdominal cramps. "miralax is not working". Dicyclomine helps with cramping.  =========== 2/3/21 Says Amitiza was working bid. Says the first week it gave him diarrhea and cramps. Then that resolved and he was having a BM every morning "like clockwork". Now he is back to where he is still having a BM daily but is still getting stomach cramps. He is taking dicyclomine about qod. This helps the cramping but for a while he was off dicyclomine.  His father has been in hospital now for the past month - first prostate, then colon CA and covid. This has been very stressful  ==================== 3/15/21 KUB 2/2021 showed mild to moderate stool in colon while on Amitiza . Started on Linzess 145 "up and down".  Says he didn't have a BM for 2 days then yesterday "spent 2 hours in the bathroom". When he has a  BM "its like diarrhea'. Says "I dont mind that because I can empty out". Wants to continue to try linzess for now.. He still takes dicyclomine prn for cramping.  STates as a child and over 20 yrs had a  BM once a week or once every 2 weeks.  ============================= 1/26/22 Here for f/u visit SAys linzess works much better than AMitiza but his insurance will not cover it.  He is open to trying Lactulose.  He also has problems with linzess - when he takes it daily he has only small amounts of BMs daily. When he takes qod, he feels emptied out but will have severe cramping. He agrees this is not optimal. ========================== 8/18/22 Here for f.u visit Taking lactulose - once a day - was working at first. Says linzess worked great but says "problem it wasnt  covered by insurance" Then lactulose stopped working even when he doubled the dose.  Will switch back to [employer] in October for better drug coverage  Has increased his water to a gallon a day and if essence will have a BM qod  Has a protruding belly due to constipation. With abd cramping when he does not have a BM .

## 2023-07-11 ENCOUNTER — OFFICE VISIT (OUTPATIENT)
Dept: URBAN - METROPOLITAN AREA SURGERY CENTER 31 | Facility: SURGERY CENTER | Age: 43
End: 2023-07-11

## 2023-07-21 ENCOUNTER — TELEPHONE ENCOUNTER (OUTPATIENT)
Dept: URBAN - METROPOLITAN AREA CLINIC 19 | Facility: CLINIC | Age: 43
End: 2023-07-21

## 2023-07-21 ENCOUNTER — OFFICE VISIT (OUTPATIENT)
Dept: URBAN - METROPOLITAN AREA SURGERY CENTER 31 | Facility: SURGERY CENTER | Age: 43
End: 2023-07-21
Payer: COMMERCIAL

## 2023-07-21 DIAGNOSIS — Z86.010 ADENOMAS PERSONAL HISTORY OF COLONIC POLYPS: ICD-10-CM

## 2023-07-21 DIAGNOSIS — D12.4 ADENOMA OF DESCENDING COLON: ICD-10-CM

## 2023-07-21 DIAGNOSIS — D12.3 ADENOMA OF TRANSVERSE COLON: ICD-10-CM

## 2023-07-21 DIAGNOSIS — D12.2 ADENOMA OF ASCENDING COLON: ICD-10-CM

## 2023-07-21 DIAGNOSIS — Z09 CARDIOLOGY FOLLOW-UP ENCOUNTER: ICD-10-CM

## 2023-07-21 DIAGNOSIS — D17.5 BENIGN LIPOMATOUS NEOPLASM OF INTRA-ABDOMINAL ORGANS: ICD-10-CM

## 2023-07-21 PROBLEM — 12063002: Status: ACTIVE | Noted: 2023-07-21

## 2023-07-21 PROBLEM — 129851009: Status: ACTIVE | Noted: 2023-07-21

## 2023-07-21 PROCEDURE — 45380 COLONOSCOPY AND BIOPSY: CPT | Performed by: STUDENT IN AN ORGANIZED HEALTH CARE EDUCATION/TRAINING PROGRAM

## 2023-07-21 PROCEDURE — 45385 COLONOSCOPY W/LESION REMOVAL: CPT | Performed by: STUDENT IN AN ORGANIZED HEALTH CARE EDUCATION/TRAINING PROGRAM

## 2023-07-21 PROCEDURE — G8907 PT DOC NO EVENTS ON DISCHARG: HCPCS | Performed by: STUDENT IN AN ORGANIZED HEALTH CARE EDUCATION/TRAINING PROGRAM

## 2023-07-21 RX ORDER — SULFAMETHOXAZOLE AND TRIMETHOPRIM 200; 40 MG/5ML; MG/5ML
SUSPENSION ORAL
Qty: 0 | Refills: 0 | Status: ACTIVE | COMMUNITY
Start: 1900-01-01

## 2023-07-21 RX ORDER — PREGABALIN 75 MG/1
CAPSULE ORAL
Qty: 0 | Refills: 0 | Status: ACTIVE | COMMUNITY
Start: 1900-01-01

## 2023-07-21 RX ORDER — TENAPANOR HYDROCHLORIDE 53.2 MG/1
1 TABLET IMMEDIATELY BEFORE MEALS TABLET ORAL TWICE A DAY
Qty: 60 | OUTPATIENT
Start: 2023-07-21 | End: 2023-08-20

## 2023-07-21 RX ORDER — LORAZEPAM 1 MG/1
TABLET ORAL
Qty: 0 | Refills: 0 | Status: ACTIVE | COMMUNITY
Start: 1900-01-01

## 2023-07-21 RX ORDER — INSULIN DETEMIR 100 [IU]/ML
INJECT BY SUBCUTANEOUS ROUTE PER PRESCRIBER'S INSTRUCTIONS. INSULIN DOSING REQUIRES INDIVIDUALIZATION INJECTION, SOLUTION SUBCUTANEOUS
Qty: 1 | Refills: 0 | Status: ACTIVE | COMMUNITY
Start: 1900-01-01

## 2023-07-21 RX ORDER — LUBIPROSTONE 24 UG/1
1 CAPSULE WITH FOOD AND WATER CAPSULE, GELATIN COATED ORAL TWICE A DAY
Qty: 180 | Status: ACTIVE | COMMUNITY

## 2023-07-21 RX ORDER — LACOSAMIDE 200 MG/1
TAKE 1 TABLET (200 MG) BY ORAL ROUTE 2 TIMES PER DAY TABLET, FILM COATED ORAL 2
Qty: 0 | Refills: 0 | Status: ACTIVE | COMMUNITY
Start: 1900-01-01

## 2023-07-21 RX ORDER — LACOSAMIDE 100 MG/1
1 TABLET TABLET, FILM COATED ORAL TWICE A DAY
Status: ACTIVE | COMMUNITY

## 2023-07-21 RX ORDER — DICYCLOMINE HYDROCHLORIDE 10 MG/1
TAKE 1 CAPSULE BY ORAL ROUTE 3 TIMES A DAY AS NEEDED FOR 30 DAYS CAPSULE ORAL
Qty: 270 | Refills: 0 | Status: ACTIVE | COMMUNITY

## 2023-07-21 RX ORDER — WARFARIN 5 MG/1
TABLET ORAL
Qty: 0 | Refills: 0 | Status: ACTIVE | COMMUNITY
Start: 1900-01-01

## 2023-07-21 RX ORDER — INSULIN ASPART 100 [IU]/ML
INJECT BY SUBCUTANEOUS ROUTE PER PRESCRIBER'S INSTRUCTIONS. INSULIN DOSING REQUIRES INDIVIDUALIZATION INJECTION, SOLUTION INTRAVENOUS; SUBCUTANEOUS
Qty: 1 | Refills: 0 | Status: ACTIVE | COMMUNITY
Start: 1900-01-01

## 2023-07-21 RX ORDER — LUBIPROSTONE 24 UG/1
1 CAPSULE WITH FOOD AND WATER CAPSULE, GELATIN COATED ORAL TWICE A DAY
OUTPATIENT

## 2023-07-21 RX ORDER — POLYETHYLENE GLYCOL 3350, SODIUM SULFATE ANHYDROUS, SODIUM BICARBONATE, SODIUM CHLORIDE, POTASSIUM CHLORIDE 236; 22.74; 6.74; 5.86; 2.97 G/4L; G/4L; G/4L; G/4L; G/4L
AS DIRECTED POWDER, FOR SOLUTION ORAL
Status: ACTIVE | COMMUNITY
Start: 2023-07-06

## 2023-07-21 RX ORDER — ATORVASTATIN CALCIUM 40 MG/1
1 TABLET TABLET, FILM COATED ORAL ONCE A DAY
Qty: 30 | Status: ACTIVE | COMMUNITY
Start: 2022-08-18

## 2023-07-24 ENCOUNTER — TELEPHONE ENCOUNTER (OUTPATIENT)
Dept: URBAN - METROPOLITAN AREA CLINIC 2 | Facility: CLINIC | Age: 43
End: 2023-07-24

## 2023-07-26 ENCOUNTER — TELEPHONE ENCOUNTER (OUTPATIENT)
Dept: URBAN - METROPOLITAN AREA CLINIC 2 | Facility: CLINIC | Age: 43
End: 2023-07-26

## 2023-08-28 ENCOUNTER — TELEPHONE ENCOUNTER (OUTPATIENT)
Dept: URBAN - METROPOLITAN AREA CLINIC 19 | Facility: CLINIC | Age: 43
End: 2023-08-28

## 2023-08-28 RX ORDER — DICYCLOMINE HYDROCHLORIDE 10 MG/1
TAKE 1 CAPSULE BY ORAL ROUTE 3 TIMES A DAY AS NEEDED FOR 30 DAYS CAPSULE ORAL
Qty: 90 | Refills: 1
End: 2023-10-27

## 2023-08-28 RX ORDER — DICYCLOMINE HYDROCHLORIDE 10 MG/1
TAKE 1 CAPSULE BY ORAL ROUTE 3 TIMES A DAY AS NEEDED FOR 30 DAYS CAPSULE ORAL
Qty: 90 | Refills: 1

## 2023-09-06 ENCOUNTER — OFFICE VISIT (OUTPATIENT)
Dept: URBAN - METROPOLITAN AREA CLINIC 19 | Facility: CLINIC | Age: 43
End: 2023-09-06

## 2023-09-21 ENCOUNTER — OFFICE VISIT (OUTPATIENT)
Dept: URBAN - METROPOLITAN AREA CLINIC 19 | Facility: CLINIC | Age: 43
End: 2023-09-21

## 2023-10-16 ENCOUNTER — OFFICE VISIT (OUTPATIENT)
Dept: URBAN - METROPOLITAN AREA CLINIC 128 | Facility: CLINIC | Age: 43
End: 2023-10-16

## 2023-10-16 RX ORDER — LORAZEPAM 1 MG/1
TABLET ORAL
Qty: 0 | Refills: 0 | Status: ACTIVE | COMMUNITY
Start: 1900-01-01

## 2023-10-16 RX ORDER — INSULIN ASPART 100 [IU]/ML
INJECT BY SUBCUTANEOUS ROUTE PER PRESCRIBER'S INSTRUCTIONS. INSULIN DOSING REQUIRES INDIVIDUALIZATION INJECTION, SOLUTION INTRAVENOUS; SUBCUTANEOUS
Qty: 1 | Refills: 0 | Status: ACTIVE | COMMUNITY
Start: 1900-01-01

## 2023-10-16 RX ORDER — DICYCLOMINE HYDROCHLORIDE 10 MG/1
TAKE 1 CAPSULE BY ORAL ROUTE 3 TIMES A DAY AS NEEDED FOR 30 DAYS CAPSULE ORAL
Qty: 90 | Refills: 1 | Status: ACTIVE | COMMUNITY
End: 2023-10-27

## 2023-10-16 RX ORDER — INSULIN DETEMIR 100 [IU]/ML
INJECT BY SUBCUTANEOUS ROUTE PER PRESCRIBER'S INSTRUCTIONS. INSULIN DOSING REQUIRES INDIVIDUALIZATION INJECTION, SOLUTION SUBCUTANEOUS
Qty: 1 | Refills: 0 | Status: ACTIVE | COMMUNITY
Start: 1900-01-01

## 2023-10-16 RX ORDER — PREGABALIN 75 MG/1
CAPSULE ORAL
Qty: 0 | Refills: 0 | Status: ACTIVE | COMMUNITY
Start: 1900-01-01

## 2023-10-16 RX ORDER — POLYETHYLENE GLYCOL 3350, SODIUM SULFATE ANHYDROUS, SODIUM BICARBONATE, SODIUM CHLORIDE, POTASSIUM CHLORIDE 236; 22.74; 6.74; 5.86; 2.97 G/4L; G/4L; G/4L; G/4L; G/4L
AS DIRECTED POWDER, FOR SOLUTION ORAL
Status: ACTIVE | COMMUNITY
Start: 2023-07-06

## 2023-10-16 RX ORDER — LACOSAMIDE 100 MG/1
1 TABLET TABLET, FILM COATED ORAL TWICE A DAY
Status: ACTIVE | COMMUNITY

## 2023-10-16 RX ORDER — SULFAMETHOXAZOLE AND TRIMETHOPRIM 200; 40 MG/5ML; MG/5ML
SUSPENSION ORAL
Qty: 0 | Refills: 0 | Status: ACTIVE | COMMUNITY
Start: 1900-01-01

## 2023-10-16 RX ORDER — LACOSAMIDE 200 MG/1
TAKE 1 TABLET (200 MG) BY ORAL ROUTE 2 TIMES PER DAY TABLET, FILM COATED ORAL 2
Qty: 0 | Refills: 0 | Status: ACTIVE | COMMUNITY
Start: 1900-01-01

## 2023-10-16 RX ORDER — WARFARIN 5 MG/1
TABLET ORAL
Qty: 0 | Refills: 0 | Status: ACTIVE | COMMUNITY
Start: 1900-01-01

## 2023-10-16 RX ORDER — ATORVASTATIN CALCIUM 40 MG/1
1 TABLET TABLET, FILM COATED ORAL ONCE A DAY
Qty: 30 | Status: ACTIVE | COMMUNITY
Start: 2022-08-18

## 2023-10-16 NOTE — HPI-OTHER HISTORIES
10/16/2023:  Priscilla: 44 yo M, retired , presents for follow up after colonoscopy.  Suboptimal prep.  FH of colon cancer.  Sibling had CRC.  Chronic constipation since childhood. Abdominal pain and spasms. Prescribed IBSRela.  Today his concerns are as follows.   ========================= 7/21/2023:  Colonoscopy - Suboptimal preparation.  Otherwise no findings.  Recommended repeat colonoscopy in 3 months due to prep.   7/06/2023:  Priscilla 44 yo M with long-standing h/o constipation for which he has seen Dr. Ramírez previously presents for abdominal pain.  Has been on conservative measures, miralax, amitiza, linzess and lactulose when his insurance did not cover linzess.    Was in the [facility] last week.  Was told he may need an endoscopy.    Last colonoscopy was in June 2020 and did not show any concerns other than finding a TA.  Had an EGD at the same time which showed no evidence of H. pylori.  His concerns today are as follows: Severe abdominal pain, cramping with distention and constipation.  BM 1/2 x/week.  Nausea and symptoms x 1 week.  He reports no urge to go.  Brother diagnosed with colon cancer and passed away in the past year, diagnosed at close to death around age 43 years.  Father is currently dying from prostate cancer.  Lactulose does not work and he has been taking amitiza recently. But they have not worked well for him.  Hematochezia 2 weeks ago.  Lost about 12 lbs unintentionally about 2 months ago.  He is regaining that weight.    Last colonoscopy was in 2019 with 5 year f/u if no issues.    + h/o fabry's disease.  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 ================== Previous history from Dr. Tena Ramírez:  40 yo male pt of DR Landrum. Discussed his medical history in detail "I've always been healthy and work out regularly". 5 years ago was in and out of hospital for 2 years and he was diagnosed with all the below in [facility]. He was diagnosed with DM in 2016. His last hba1c was 6.1 last week. He is type 2 diabetic. He is for hip surgery in May and had another hip surgery in 1/2020. He is seeing me today for "stomach issues" on going for the past 3 years. He initially thought it was a part of his diabetes. A few weeks ago, he felt his symptoms had become unbearable. Symptoms are worse in am and at night. In the am, he gets severe cramps in the am and pm/ Because of that he gets hypoglycemic. Says sometimes eating helps it in the evening. But from 6 am to 10.30 am it is severe, he can be hunched over the office in the am. Cramping is mid abdominal. He had an appendectomy 6 years ago and had no problems with that. Cramping wakes him up from sleep in the am. He gets relief from abdominal cramping after passing urine which he says he does frequently "3 times an hour". AFter eating, he usually will have a BM. In the past (until 2015) he had a BM once every 2 weeks. and on a good week 3 times a week. Now he has a BM 3 times a day for the past 5 years. Stools are not hard. Least number of BMs is one a day. There is no blood in stool and no fhx of colon CA. He does not use nsaids. He has determined that cramps have nothing to do with BG levels either high or low.  5/7/20 STill having severe abdominal pain. Wife present today. clarifies today to saw he has had blood in stool. He thinks from straining as "I split my butt crack". First time was a few years ago. Occurence once every couple of months. kUB only showing mild fecal loading. He notes a loosening in stool consistency in the last month. He states he has been checked for a UTI multiple times. Says he has had an elevated wbc "they have never been able to find a cause". He has seen hematology and told nothing to be concerned about. He says it has gone from high to moderate as at last draw. wbc 11/2019 is 6. He is taking pantoprazole "that makes it worse".  5/28/20 Says "I feel great!" SAys as soon as his colonoscopy, he felt great and that his stomach was flat. "it has been protruding for 3 years". Discussed CT/EGD/colonoscopy . 10/7/20 Not doing well " my symptoms are back to where they were before the colonoscopy" He is going through a divorce. Has 2 kids.  Is having a BM only twice a week. Abdominal cramps. "miralax is not working". Dicyclomine helps with cramping.  =========== 2/3/21 Says Amitiza was working bid. Says the first week it gave him diarrhea and cramps. Then that resolved and he was having a BM every morning "like clockwork". Now he is back to where he is still having a BM daily but is still getting stomach cramps. He is taking dicyclomine about qod. This helps the cramping but for a while he was off dicyclomine.  His father has been in hospital now for the past month - first prostate, then colon CA and covid. This has been very stressful  ==================== 3/15/21 KUB 2/2021 showed mild to moderate stool in colon while on Amitiza . Started on Linzess 145 "up and down".  Says he didn't have a BM for 2 days then yesterday "spent 2 hours in the bathroom". When he has a  BM "its like diarrhea'. Says "I dont mind that because I can empty out". Wants to continue to try linzess for now.. He still takes dicyclomine prn for cramping.  STates as a child and over 20 yrs had a  BM once a week or once every 2 weeks.  ============================= 1/26/22 Here for f/u visit SAys linzess works much better than AMitiza but his insurance will not cover it.  He is open to trying Lactulose.  He also has problems with linzess - when he takes it daily he has only small amounts of BMs daily. When he takes qod, he feels emptied out but will have severe cramping. He agrees this is not optimal. ========================== 8/18/22 Here for f.u visit Taking lactulose - once a day - was working at first. Says linzess worked great but says "problem it wasnt  covered by insurance" Then lactulose stopped working even when he doubled the dose.  Will switch back to [employer] in October for better drug coverage  Has increased his water to a gallon a day and if essence will have a BM qod  Has a protruding belly due to constipation. With abd cramping when he does not have a BM .

## 2023-10-24 ENCOUNTER — TELEPHONE ENCOUNTER (OUTPATIENT)
Dept: URBAN - METROPOLITAN AREA CLINIC 18 | Facility: CLINIC | Age: 43
End: 2023-10-24

## 2023-10-24 ENCOUNTER — WEB ENCOUNTER (OUTPATIENT)
Age: 43
End: 2023-10-24

## 2023-10-24 RX ORDER — TENAPANOR HYDROCHLORIDE 53.2 MG/1
1 TABLET IMMEDIATELY BEFORE MEALS TABLET ORAL TWICE A DAY
Qty: 60 | Refills: 5
Start: 2023-07-21

## 2023-10-24 RX ORDER — TENAPANOR HYDROCHLORIDE 53.2 MG/1
1 TABLET IMMEDIATELY BEFORE MEALS TABLET ORAL TWICE A DAY
Qty: 60
Start: 2023-07-21 | End: 2023-11-23

## 2023-12-05 ENCOUNTER — OFFICE VISIT (OUTPATIENT)
Dept: URBAN - METROPOLITAN AREA CLINIC 128 | Facility: CLINIC | Age: 43
End: 2023-12-05

## 2023-12-05 RX ORDER — ATORVASTATIN CALCIUM 40 MG/1
1 TABLET TABLET, FILM COATED ORAL ONCE A DAY
Qty: 30 | Status: ACTIVE | COMMUNITY
Start: 2022-08-18

## 2023-12-05 RX ORDER — SULFAMETHOXAZOLE AND TRIMETHOPRIM 200; 40 MG/5ML; MG/5ML
SUSPENSION ORAL
Qty: 0 | Refills: 0 | Status: ACTIVE | COMMUNITY
Start: 1900-01-01

## 2023-12-05 RX ORDER — POLYETHYLENE GLYCOL 3350, SODIUM SULFATE ANHYDROUS, SODIUM BICARBONATE, SODIUM CHLORIDE, POTASSIUM CHLORIDE 236; 22.74; 6.74; 5.86; 2.97 G/4L; G/4L; G/4L; G/4L; G/4L
AS DIRECTED POWDER, FOR SOLUTION ORAL
Status: ACTIVE | COMMUNITY
Start: 2023-07-06

## 2023-12-05 RX ORDER — POLYETHYLENE GLYCOL 3350, SODIUM SULFATE ANHYDROUS, SODIUM BICARBONATE, SODIUM CHLORIDE, POTASSIUM CHLORIDE 236; 22.74; 6.74; 5.86; 2.97 G/4L; G/4L; G/4L; G/4L; G/4L
AS DIRECTED POWDER, FOR SOLUTION ORAL
OUTPATIENT

## 2023-12-05 RX ORDER — PREGABALIN 75 MG/1
CAPSULE ORAL
Qty: 0 | Refills: 0 | Status: ACTIVE | COMMUNITY
Start: 1900-01-01

## 2023-12-05 RX ORDER — INSULIN ASPART 100 [IU]/ML
INJECT BY SUBCUTANEOUS ROUTE PER PRESCRIBER'S INSTRUCTIONS. INSULIN DOSING REQUIRES INDIVIDUALIZATION INJECTION, SOLUTION INTRAVENOUS; SUBCUTANEOUS
Qty: 1 | Refills: 0 | Status: ACTIVE | COMMUNITY
Start: 1900-01-01

## 2023-12-05 RX ORDER — LACOSAMIDE 100 MG/1
1 TABLET TABLET, FILM COATED ORAL TWICE A DAY
Status: ACTIVE | COMMUNITY

## 2023-12-05 RX ORDER — LACOSAMIDE 200 MG/1
TAKE 1 TABLET (200 MG) BY ORAL ROUTE 2 TIMES PER DAY TABLET, FILM COATED ORAL 2
Qty: 0 | Refills: 0 | Status: ACTIVE | COMMUNITY
Start: 1900-01-01

## 2023-12-05 RX ORDER — INSULIN DETEMIR 100 [IU]/ML
INJECT BY SUBCUTANEOUS ROUTE PER PRESCRIBER'S INSTRUCTIONS. INSULIN DOSING REQUIRES INDIVIDUALIZATION INJECTION, SOLUTION SUBCUTANEOUS
Qty: 1 | Refills: 0 | Status: ACTIVE | COMMUNITY
Start: 1900-01-01

## 2023-12-05 RX ORDER — WARFARIN 5 MG/1
TABLET ORAL
Qty: 0 | Refills: 0 | Status: ACTIVE | COMMUNITY
Start: 1900-01-01

## 2023-12-05 RX ORDER — TENAPANOR HYDROCHLORIDE 53.2 MG/1
1 TABLET IMMEDIATELY BEFORE MEALS TABLET ORAL TWICE A DAY
Qty: 60 | Refills: 5 | Status: ACTIVE | COMMUNITY
Start: 2023-07-21

## 2023-12-05 RX ORDER — LORAZEPAM 1 MG/1
TABLET ORAL
Qty: 0 | Refills: 0 | Status: ACTIVE | COMMUNITY
Start: 1900-01-01

## 2023-12-05 NOTE — HPI-OTHER HISTORIES
12/5/2023:  Priscilla: 42 yo M, retired , presents for follow up after colonoscopy.  Suboptimal prep.  FH of colon cancer.  Sibling had CRC.  Chronic constipation since childhood. Abdominal pain and spasms. Prescribed IBSRela.  Today his concerns are as follows.   ========================= 7/21/2023:  Colonoscopy - Suboptimal preparation.  Otherwise no findings.  Recommended repeat colonoscopy in 3 months due to prep.   7/06/2023:  Priscilla 42 yo M with long-standing h/o constipation for which he has seen Dr. Ramírez previously presents for abdominal pain.  Has been on conservative measures, miralax, amitiza, linzess and lactulose when his insurance did not cover linzess.    Was in the [facility] last week.  Was told he may need an endoscopy.    Last colonoscopy was in June 2020 and did not show any concerns other than finding a TA.  Had an EGD at the same time which showed no evidence of H. pylori.  His concerns today are as follows: Severe abdominal pain, cramping with distention and constipation.  BM 1/2 x/week.  Nausea and symptoms x 1 week.  He reports no urge to go.  Brother diagnosed with colon cancer and passed away in the past year, diagnosed at close to death around age 43 years.  Father is currently dying from prostate cancer.  Lactulose does not work and he has been taking amitiza recently. But they have not worked well for him.  Hematochezia 2 weeks ago.  Lost about 12 lbs unintentionally about 2 months ago.  He is regaining that weight.    Last colonoscopy was in 2019 with 5 year f/u if no issues.    + h/o fabry's disease.  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 ================== Previous history from Dr. Tena Ramírez:  40 yo male pt of DR Landrum. Discussed his medical history in detail "I've always been healthy and work out regularly". 5 years ago was in and out of hospital for 2 years and he was diagnosed with all the below in [facility]. He was diagnosed with DM in 2016. His last hba1c was 6.1 last week. He is type 2 diabetic. He is for hip surgery in May and had another hip surgery in 1/2020. He is seeing me today for "stomach issues" on going for the past 3 years. He initially thought it was a part of his diabetes. A few weeks ago, he felt his symptoms had become unbearable. Symptoms are worse in am and at night. In the am, he gets severe cramps in the am and pm/ Because of that he gets hypoglycemic. Says sometimes eating helps it in the evening. But from 6 am to 10.30 am it is severe, he can be hunched over the office in the am. Cramping is mid abdominal. He had an appendectomy 6 years ago and had no problems with that. Cramping wakes him up from sleep in the am. He gets relief from abdominal cramping after passing urine which he says he does frequently "3 times an hour". AFter eating, he usually will have a BM. In the past (until 2015) he had a BM once every 2 weeks. and on a good week 3 times a week. Now he has a BM 3 times a day for the past 5 years. Stools are not hard. Least number of BMs is one a day. There is no blood in stool and no fhx of colon CA. He does not use nsaids. He has determined that cramps have nothing to do with BG levels either high or low.  5/7/20 STill having severe abdominal pain. Wife present today. clarifies today to saw he has had blood in stool. He thinks from straining as "I split my butt crack". First time was a few years ago. Occurence once every couple of months. kUB only showing mild fecal loading. He notes a loosening in stool consistency in the last month. He states he has been checked for a UTI multiple times. Says he has had an elevated wbc "they have never been able to find a cause". He has seen hematology and told nothing to be concerned about. He says it has gone from high to moderate as at last draw. wbc 11/2019 is 6. He is taking pantoprazole "that makes it worse".  5/28/20 Says "I feel great!" SAys as soon as his colonoscopy, he felt great and that his stomach was flat. "it has been protruding for 3 years". Discussed CT/EGD/colonoscopy . 10/7/20 Not doing well " my symptoms are back to where they were before the colonoscopy" He is going through a divorce. Has 2 kids.  Is having a BM only twice a week. Abdominal cramps. "miralax is not working". Dicyclomine helps with cramping.  =========== 2/3/21 Says Amitiza was working bid. Says the first week it gave him diarrhea and cramps. Then that resolved and he was having a BM every morning "like clockwork". Now he is back to where he is still having a BM daily but is still getting stomach cramps. He is taking dicyclomine about qod. This helps the cramping but for a while he was off dicyclomine.  His father has been in hospital now for the past month - first prostate, then colon CA and covid. This has been very stressful  ==================== 3/15/21 KUB 2/2021 showed mild to moderate stool in colon while on Amitiza . Started on Linzess 145 "up and down".  Says he didn't have a BM for 2 days then yesterday "spent 2 hours in the bathroom". When he has a  BM "its like diarrhea'. Says "I dont mind that because I can empty out". Wants to continue to try linzess for now.. He still takes dicyclomine prn for cramping.  STates as a child and over 20 yrs had a  BM once a week or once every 2 weeks.  ============================= 1/26/22 Here for f/u visit SAys linzess works much better than AMitiza but his insurance will not cover it.  He is open to trying Lactulose.  He also has problems with linzess - when he takes it daily he has only small amounts of BMs daily. When he takes qod, he feels emptied out but will have severe cramping. He agrees this is not optimal. ========================== 8/18/22 Here for f.u visit Taking lactulose - once a day - was working at first. Says linzess worked great but says "problem it wasnt  covered by insurance" Then lactulose stopped working even when he doubled the dose.  Will switch back to [employer] in October for better drug coverage  Has increased his water to a gallon a day and if essence will have a BM qod  Has a protruding belly due to constipation. With abd cramping when he does not have a BM .

## 2024-05-21 ENCOUNTER — LAB OUTSIDE AN ENCOUNTER (OUTPATIENT)
Dept: URBAN - METROPOLITAN AREA CLINIC 128 | Facility: CLINIC | Age: 44
End: 2024-05-21

## 2024-05-21 ENCOUNTER — OFFICE VISIT (OUTPATIENT)
Dept: URBAN - METROPOLITAN AREA CLINIC 128 | Facility: CLINIC | Age: 44
End: 2024-05-21
Payer: COMMERCIAL

## 2024-05-21 ENCOUNTER — DASHBOARD ENCOUNTERS (OUTPATIENT)
Age: 44
End: 2024-05-21

## 2024-05-21 VITALS
DIASTOLIC BLOOD PRESSURE: 85 MMHG | TEMPERATURE: 97.7 F | SYSTOLIC BLOOD PRESSURE: 143 MMHG | HEART RATE: 70 BPM | OXYGEN SATURATION: 98 % | BODY MASS INDEX: 28.67 KG/M2 | WEIGHT: 204.8 LBS | HEIGHT: 71 IN

## 2024-05-21 DIAGNOSIS — R14.0 ABDOMINAL DISTENTION: ICD-10-CM

## 2024-05-21 DIAGNOSIS — K59.01 SLOW TRANSIT CONSTIPATION: ICD-10-CM

## 2024-05-21 DIAGNOSIS — E13.9 DIABETES 1.5, MANAGED AS TYPE 2: ICD-10-CM

## 2024-05-21 DIAGNOSIS — Z80.0 FAMILY HISTORY OF COLON CANCER: ICD-10-CM

## 2024-05-21 PROCEDURE — 99215 OFFICE O/P EST HI 40 MIN: CPT | Performed by: STUDENT IN AN ORGANIZED HEALTH CARE EDUCATION/TRAINING PROGRAM

## 2024-05-21 RX ORDER — POLYETHYLENE GLYCOL 3350, SODIUM SULFATE ANHYDROUS, SODIUM BICARBONATE, SODIUM CHLORIDE, POTASSIUM CHLORIDE 236; 22.74; 6.74; 5.86; 2.97 G/4L; G/4L; G/4L; G/4L; G/4L
AS DIRECTED POWDER, FOR SOLUTION ORAL
Status: ON HOLD | COMMUNITY

## 2024-05-21 RX ORDER — LACTULOSE 10 G/10G
1 PACKET AS NEEDED SOLUTION ORAL ONCE A DAY
Status: ACTIVE | COMMUNITY

## 2024-05-21 RX ORDER — ATORVASTATIN CALCIUM 40 MG/1
1 TABLET TABLET, FILM COATED ORAL ONCE A DAY
Qty: 30 | Status: ACTIVE | COMMUNITY
Start: 2022-08-18

## 2024-05-21 RX ORDER — POLYETHYLENE GLYCOL 3350, SODIUM SULFATE ANHYDROUS, SODIUM BICARBONATE, SODIUM CHLORIDE, POTASSIUM CHLORIDE 236; 22.74; 6.74; 5.86; 2.97 G/4L; G/4L; G/4L; G/4L; G/4L
4000 ML POWDER, FOR SOLUTION ORAL
Qty: 1 KIT | Refills: 0 | OUTPATIENT

## 2024-05-21 RX ORDER — DICYCLOMINE HYDROCHLORIDE 20 MG/2ML
1 ML INJECTION, SOLUTION INTRAMUSCULAR
Status: ACTIVE | COMMUNITY

## 2024-05-21 RX ORDER — METOPROLOL TARTRATE 25 MG/1
1 TABLET WITH FOOD TABLET, FILM COATED ORAL TWICE A DAY
Status: ACTIVE | COMMUNITY

## 2024-05-21 RX ORDER — INSULIN ASPART 100 [IU]/ML
INJECT BY SUBCUTANEOUS ROUTE PER PRESCRIBER'S INSTRUCTIONS. INSULIN DOSING REQUIRES INDIVIDUALIZATION INJECTION, SOLUTION INTRAVENOUS; SUBCUTANEOUS
Qty: 1 | Refills: 0 | Status: ACTIVE | COMMUNITY
Start: 1900-01-01

## 2024-05-21 RX ORDER — LORAZEPAM 1 MG/1
TABLET ORAL
Qty: 0 | Refills: 0 | Status: ACTIVE | COMMUNITY
Start: 1900-01-01

## 2024-05-21 RX ORDER — ESCITALOPRAM OXALATE 20 MG/1
1 TABLET TABLET ORAL ONCE A DAY
Status: ACTIVE | COMMUNITY

## 2024-05-21 RX ORDER — PREGABALIN 75 MG/1
CAPSULE ORAL
Qty: 0 | Refills: 0 | Status: ACTIVE | COMMUNITY
Start: 1900-01-01

## 2024-05-21 RX ORDER — LACOSAMIDE 100 MG/1
1 TABLET TABLET, FILM COATED ORAL TWICE A DAY
Status: ACTIVE | COMMUNITY

## 2024-05-21 RX ORDER — INSULIN DEGLUDEC INJECTION 200 U/ML
AS DIRECTED INJECTION, SOLUTION SUBCUTANEOUS
Status: ACTIVE | COMMUNITY

## 2024-05-21 RX ORDER — WARFARIN 5 MG/1
TABLET ORAL
Qty: 0 | Refills: 0 | Status: ACTIVE | COMMUNITY
Start: 1900-01-01

## 2024-05-21 RX ORDER — EZETIMIBE 10 MG/1
1 TABLET TABLET ORAL ONCE A DAY
Status: ACTIVE | COMMUNITY

## 2024-05-21 RX ORDER — TENAPANOR HYDROCHLORIDE 53.2 MG/1
TAKE 1 TABLET BY MOUTH TWICE A DAY IMMEDIATELY BEFORE MEALS TABLET ORAL
Qty: 60 TABLET | Refills: 5 | Status: ACTIVE | COMMUNITY

## 2024-05-21 RX ORDER — SULFAMETHOXAZOLE AND TRIMETHOPRIM 200; 40 MG/5ML; MG/5ML
SUSPENSION ORAL
Qty: 0 | Refills: 0 | Status: ACTIVE | COMMUNITY
Start: 1900-01-01

## 2024-05-21 RX ORDER — CARVEDILOL 12.5 MG/1
1 TABLET WITH FOOD TABLET, FILM COATED ORAL TWICE A DAY
Status: ACTIVE | COMMUNITY

## 2024-05-21 RX ORDER — LACOSAMIDE 200 MG/1
TAKE 1 TABLET (200 MG) BY ORAL ROUTE 2 TIMES PER DAY TABLET, FILM COATED ORAL 2
Qty: 0 | Refills: 0 | Status: ON HOLD | COMMUNITY
Start: 1900-01-01

## 2024-05-21 RX ORDER — INSULIN DETEMIR 100 [IU]/ML
INJECT BY SUBCUTANEOUS ROUTE PER PRESCRIBER'S INSTRUCTIONS. INSULIN DOSING REQUIRES INDIVIDUALIZATION INJECTION, SOLUTION SUBCUTANEOUS
Qty: 1 | Refills: 0 | Status: ACTIVE | COMMUNITY
Start: 1900-01-01

## 2024-05-21 RX ORDER — QUETIAPINE 100 MG/1
1 TABLET AT BEDTIME TABLET, FILM COATED ORAL ONCE A DAY
Status: ACTIVE | COMMUNITY

## 2024-07-26 ENCOUNTER — OFFICE VISIT (OUTPATIENT)
Dept: URBAN - METROPOLITAN AREA SURGERY CENTER 31 | Facility: SURGERY CENTER | Age: 44
End: 2024-07-26

## 2024-08-01 ENCOUNTER — LAB OUTSIDE AN ENCOUNTER (OUTPATIENT)
Dept: URBAN - METROPOLITAN AREA CLINIC 19 | Facility: CLINIC | Age: 44
End: 2024-08-01

## 2024-08-01 ENCOUNTER — CLAIMS CREATED FROM THE CLAIM WINDOW (OUTPATIENT)
Dept: URBAN - METROPOLITAN AREA CLINIC 4 | Facility: CLINIC | Age: 44
End: 2024-08-01
Payer: COMMERCIAL

## 2024-08-01 ENCOUNTER — OFFICE VISIT (OUTPATIENT)
Dept: URBAN - METROPOLITAN AREA SURGERY CENTER 31 | Facility: SURGERY CENTER | Age: 44
End: 2024-08-01
Payer: COMMERCIAL

## 2024-08-01 ENCOUNTER — TELEPHONE ENCOUNTER (OUTPATIENT)
Dept: URBAN - METROPOLITAN AREA CLINIC 19 | Facility: CLINIC | Age: 44
End: 2024-08-01

## 2024-08-01 DIAGNOSIS — K63.89 OTHER SPECIFIED DISEASES OF INTESTINE: ICD-10-CM

## 2024-08-01 DIAGNOSIS — Z80.0 FAMILY HISTORY OF COLON CANCER: ICD-10-CM

## 2024-08-01 DIAGNOSIS — Z80.0 BROTHER AT YOUNG AGE FAMILY HISTORY OF COLON CANCER: ICD-10-CM

## 2024-08-01 DIAGNOSIS — D12.8 ADENOMATOUS RECTAL POLYP: ICD-10-CM

## 2024-08-01 DIAGNOSIS — K59.03 CONSTIPATION DUE TO OPIOID THERAPY: ICD-10-CM

## 2024-08-01 DIAGNOSIS — Z12.11 COLON CANCER SCREENING: ICD-10-CM

## 2024-08-01 PROCEDURE — 45380 COLONOSCOPY AND BIOPSY: CPT | Performed by: STUDENT IN AN ORGANIZED HEALTH CARE EDUCATION/TRAINING PROGRAM

## 2024-08-01 PROCEDURE — 88305 TISSUE EXAM BY PATHOLOGIST: CPT | Performed by: PATHOLOGY

## 2024-08-01 PROCEDURE — 00811 ANES LWR INTST NDSC NOS: CPT | Performed by: NURSE ANESTHETIST, CERTIFIED REGISTERED

## 2024-08-01 RX ORDER — DICYCLOMINE HYDROCHLORIDE 20 MG/2ML
1 ML INJECTION, SOLUTION INTRAMUSCULAR
Status: ACTIVE | COMMUNITY

## 2024-08-01 RX ORDER — CARVEDILOL 12.5 MG/1
1 TABLET WITH FOOD TABLET, FILM COATED ORAL TWICE A DAY
Status: ACTIVE | COMMUNITY

## 2024-08-01 RX ORDER — PREGABALIN 75 MG/1
CAPSULE ORAL
Qty: 0 | Refills: 0 | Status: ACTIVE | COMMUNITY
Start: 1900-01-01

## 2024-08-01 RX ORDER — INSULIN DEGLUDEC INJECTION 200 U/ML
AS DIRECTED INJECTION, SOLUTION SUBCUTANEOUS
Status: ACTIVE | COMMUNITY

## 2024-08-01 RX ORDER — TENAPANOR HYDROCHLORIDE 53.2 MG/1
TAKE 1 TABLET BY MOUTH TWICE A DAY IMMEDIATELY BEFORE MEALS TABLET ORAL
Qty: 60 TABLET | Refills: 5 | Status: ACTIVE | COMMUNITY

## 2024-08-01 RX ORDER — ATORVASTATIN CALCIUM 40 MG/1
1 TABLET TABLET, FILM COATED ORAL ONCE A DAY
Qty: 30 | Status: ACTIVE | COMMUNITY
Start: 2022-08-18

## 2024-08-01 RX ORDER — LACOSAMIDE 100 MG/1
1 TABLET TABLET, FILM COATED ORAL TWICE A DAY
Status: ACTIVE | COMMUNITY

## 2024-08-01 RX ORDER — SULFAMETHOXAZOLE AND TRIMETHOPRIM 200; 40 MG/5ML; MG/5ML
SUSPENSION ORAL
Qty: 0 | Refills: 0 | Status: ACTIVE | COMMUNITY
Start: 1900-01-01

## 2024-08-01 RX ORDER — POLYETHYLENE GLYCOL 3350, SODIUM SULFATE ANHYDROUS, SODIUM BICARBONATE, SODIUM CHLORIDE, POTASSIUM CHLORIDE 236; 22.74; 6.74; 5.86; 2.97 G/4L; G/4L; G/4L; G/4L; G/4L
4000 ML POWDER, FOR SOLUTION ORAL
Qty: 1 KIT | Refills: 0 | Status: ACTIVE | COMMUNITY

## 2024-08-01 RX ORDER — EZETIMIBE 10 MG/1
1 TABLET TABLET ORAL ONCE A DAY
Status: ACTIVE | COMMUNITY

## 2024-08-01 RX ORDER — LORAZEPAM 1 MG/1
TABLET ORAL
Qty: 0 | Refills: 0 | Status: ACTIVE | COMMUNITY
Start: 1900-01-01

## 2024-08-01 RX ORDER — WARFARIN 5 MG/1
TABLET ORAL
Qty: 0 | Refills: 0 | Status: ACTIVE | COMMUNITY
Start: 1900-01-01

## 2024-08-01 RX ORDER — LACOSAMIDE 200 MG/1
TAKE 1 TABLET (200 MG) BY ORAL ROUTE 2 TIMES PER DAY TABLET, FILM COATED ORAL 2
Qty: 0 | Refills: 0 | Status: ON HOLD | COMMUNITY
Start: 1900-01-01

## 2024-08-01 RX ORDER — INSULIN DETEMIR 100 [IU]/ML
INJECT BY SUBCUTANEOUS ROUTE PER PRESCRIBER'S INSTRUCTIONS. INSULIN DOSING REQUIRES INDIVIDUALIZATION INJECTION, SOLUTION SUBCUTANEOUS
Qty: 1 | Refills: 0 | Status: ACTIVE | COMMUNITY
Start: 1900-01-01

## 2024-08-01 RX ORDER — INSULIN ASPART 100 [IU]/ML
INJECT BY SUBCUTANEOUS ROUTE PER PRESCRIBER'S INSTRUCTIONS. INSULIN DOSING REQUIRES INDIVIDUALIZATION INJECTION, SOLUTION INTRAVENOUS; SUBCUTANEOUS
Qty: 1 | Refills: 0 | Status: ACTIVE | COMMUNITY
Start: 1900-01-01

## 2024-08-01 RX ORDER — ESCITALOPRAM OXALATE 20 MG/1
1 TABLET TABLET ORAL ONCE A DAY
Status: ACTIVE | COMMUNITY

## 2024-08-01 RX ORDER — QUETIAPINE 100 MG/1
1 TABLET AT BEDTIME TABLET, FILM COATED ORAL ONCE A DAY
Status: ACTIVE | COMMUNITY

## 2024-08-01 RX ORDER — METOPROLOL TARTRATE 25 MG/1
1 TABLET WITH FOOD TABLET, FILM COATED ORAL TWICE A DAY
Status: ACTIVE | COMMUNITY

## 2024-08-01 RX ORDER — LACTULOSE 10 G/10G
1 PACKET AS NEEDED SOLUTION ORAL ONCE A DAY
Status: ACTIVE | COMMUNITY

## 2024-08-12 ENCOUNTER — TELEPHONE ENCOUNTER (OUTPATIENT)
Dept: URBAN - METROPOLITAN AREA CLINIC 19 | Facility: CLINIC | Age: 44
End: 2024-08-12

## 2024-10-01 ENCOUNTER — OFFICE VISIT (OUTPATIENT)
Dept: URBAN - METROPOLITAN AREA MEDICAL CENTER 28 | Facility: MEDICAL CENTER | Age: 44
End: 2024-10-01
Payer: COMMERCIAL

## 2024-10-01 DIAGNOSIS — K59.09 CONSTIPATION: ICD-10-CM

## 2024-10-01 PROCEDURE — 91122 ANAL PRESSURE RECORD: CPT | Performed by: INTERNAL MEDICINE

## 2024-10-01 PROCEDURE — 91120 RECTAL SENSATION TEST: CPT | Performed by: INTERNAL MEDICINE

## 2024-10-14 ENCOUNTER — TELEPHONE ENCOUNTER (OUTPATIENT)
Dept: URBAN - METROPOLITAN AREA CLINIC 128 | Facility: CLINIC | Age: 44
End: 2024-10-14

## 2024-10-21 ENCOUNTER — OFFICE VISIT (OUTPATIENT)
Dept: URBAN - METROPOLITAN AREA CLINIC 19 | Facility: CLINIC | Age: 44
End: 2024-10-21

## 2024-10-29 ENCOUNTER — OFFICE VISIT (OUTPATIENT)
Dept: URBAN - METROPOLITAN AREA CLINIC 128 | Facility: CLINIC | Age: 44
End: 2024-10-29
Payer: COMMERCIAL

## 2024-10-29 VITALS
SYSTOLIC BLOOD PRESSURE: 110 MMHG | HEIGHT: 71 IN | OXYGEN SATURATION: 95 % | HEART RATE: 93 BPM | DIASTOLIC BLOOD PRESSURE: 70 MMHG | BODY MASS INDEX: 28.42 KG/M2 | WEIGHT: 203 LBS | TEMPERATURE: 97.9 F

## 2024-10-29 DIAGNOSIS — Z80.0 FAMILY HISTORY OF COLON CANCER: ICD-10-CM

## 2024-10-29 DIAGNOSIS — K59.01 SLOW TRANSIT CONSTIPATION: ICD-10-CM

## 2024-10-29 DIAGNOSIS — M62.89 HIGH-TONE PELVIC FLOOR DYSFUNCTION: ICD-10-CM

## 2024-10-29 PROBLEM — 711263002: Status: ACTIVE | Noted: 2024-10-29

## 2024-10-29 PROCEDURE — 99214 OFFICE O/P EST MOD 30 MIN: CPT | Performed by: STUDENT IN AN ORGANIZED HEALTH CARE EDUCATION/TRAINING PROGRAM

## 2024-10-29 RX ORDER — POLYETHYLENE GLYCOL 3350, SODIUM SULFATE ANHYDROUS, SODIUM BICARBONATE, SODIUM CHLORIDE, POTASSIUM CHLORIDE 236; 22.74; 6.74; 5.86; 2.97 G/4L; G/4L; G/4L; G/4L; G/4L
4000 ML POWDER, FOR SOLUTION ORAL
Qty: 1 KIT | Refills: 0 | Status: ACTIVE | COMMUNITY

## 2024-10-29 RX ORDER — INSULIN DETEMIR 100 [IU]/ML
INJECT BY SUBCUTANEOUS ROUTE PER PRESCRIBER'S INSTRUCTIONS. INSULIN DOSING REQUIRES INDIVIDUALIZATION INJECTION, SOLUTION SUBCUTANEOUS
Qty: 1 | Refills: 0 | Status: ACTIVE | COMMUNITY
Start: 1900-01-01

## 2024-10-29 RX ORDER — LACTULOSE 10 G/10G
1 PACKET AS NEEDED SOLUTION ORAL ONCE A DAY
Status: ACTIVE | COMMUNITY

## 2024-10-29 RX ORDER — PREGABALIN 75 MG/1
CAPSULE ORAL
Qty: 0 | Refills: 0 | Status: ACTIVE | COMMUNITY
Start: 1900-01-01

## 2024-10-29 RX ORDER — TENAPANOR HYDROCHLORIDE 53.2 MG/1
TAKE 1 TABLET BY MOUTH TWICE A DAY IMMEDIATELY BEFORE MEALS TABLET ORAL
Qty: 60 TABLET | Refills: 5 | Status: ACTIVE | COMMUNITY

## 2024-10-29 RX ORDER — ESCITALOPRAM OXALATE 20 MG/1
1 TABLET TABLET ORAL ONCE A DAY
Status: ACTIVE | COMMUNITY

## 2024-10-29 RX ORDER — QUETIAPINE 100 MG/1
1 TABLET AT BEDTIME TABLET, FILM COATED ORAL ONCE A DAY
Status: ACTIVE | COMMUNITY

## 2024-10-29 RX ORDER — WARFARIN 5 MG/1
TABLET ORAL
Qty: 0 | Refills: 0 | Status: ACTIVE | COMMUNITY
Start: 1900-01-01

## 2024-10-29 RX ORDER — EZETIMIBE 10 MG/1
1 TABLET TABLET ORAL ONCE A DAY
Status: ACTIVE | COMMUNITY

## 2024-10-29 RX ORDER — INSULIN ASPART 100 [IU]/ML
INJECT BY SUBCUTANEOUS ROUTE PER PRESCRIBER'S INSTRUCTIONS. INSULIN DOSING REQUIRES INDIVIDUALIZATION INJECTION, SOLUTION INTRAVENOUS; SUBCUTANEOUS
Qty: 1 | Refills: 0 | Status: ACTIVE | COMMUNITY
Start: 1900-01-01

## 2024-10-29 RX ORDER — CARVEDILOL 12.5 MG/1
1 TABLET WITH FOOD TABLET, FILM COATED ORAL TWICE A DAY
Status: ACTIVE | COMMUNITY

## 2024-10-29 RX ORDER — INSULIN DEGLUDEC INJECTION 200 U/ML
AS DIRECTED INJECTION, SOLUTION SUBCUTANEOUS
Status: ACTIVE | COMMUNITY

## 2024-10-29 RX ORDER — SULFAMETHOXAZOLE AND TRIMETHOPRIM 200; 40 MG/5ML; MG/5ML
SUSPENSION ORAL
Qty: 0 | Refills: 0 | Status: ACTIVE | COMMUNITY
Start: 1900-01-01

## 2024-10-29 RX ORDER — LACOSAMIDE 100 MG/1
1 TABLET TABLET, FILM COATED ORAL TWICE A DAY
Status: ACTIVE | COMMUNITY

## 2024-10-29 RX ORDER — ATORVASTATIN CALCIUM 40 MG/1
1 TABLET TABLET, FILM COATED ORAL ONCE A DAY
Qty: 30 | Status: ACTIVE | COMMUNITY
Start: 2022-08-18

## 2024-10-29 RX ORDER — METOPROLOL TARTRATE 25 MG/1
1 TABLET WITH FOOD TABLET, FILM COATED ORAL TWICE A DAY
Status: ACTIVE | COMMUNITY

## 2024-10-29 RX ORDER — LACOSAMIDE 200 MG/1
TAKE 1 TABLET (200 MG) BY ORAL ROUTE 2 TIMES PER DAY TABLET, FILM COATED ORAL 2
Qty: 0 | Refills: 0 | Status: ON HOLD | COMMUNITY
Start: 1900-01-01

## 2024-10-29 RX ORDER — BISACODYL 10 MG/1
1 SUPPOSITORY AS NEEDED SUPPOSITORY RECTAL ONCE A DAY
Qty: 7 | Refills: 11 | OUTPATIENT
Start: 2024-10-29 | End: 2025-01-20

## 2024-10-29 RX ORDER — DICYCLOMINE HYDROCHLORIDE 20 MG/2ML
1 ML INJECTION, SOLUTION INTRAMUSCULAR
Status: ACTIVE | COMMUNITY

## 2024-10-29 RX ORDER — LORAZEPAM 1 MG/1
TABLET ORAL
Qty: 0 | Refills: 0 | Status: ACTIVE | COMMUNITY
Start: 1900-01-01

## 2024-10-29 NOTE — HPI-TODAY'S VISIT:
10/29/2024:  Priscilla: 43 yo M with chronic long-standing constipation and strong family h/o colon cancer with both father and sibling with colon cancer presents for f/u.  Recently underwent colonoscopy on 8/1/2024.  Prep was extensive due to previously failed prep, and a CVA following coumadin hold for previous colonoscopy. This time we used linzess 290 mcg daily for 1 week beforehand, and a 2-day diabetic prep with golytely with low residue diet prior to colonoscopy.  His BBPS score was 8/9.  He is due for recall surveillance colonoscopy in 3 years in 2027, based on findings and family history. His bowel regimen includes IBSRela 3 tablets every 2 days. He underwent an anorectal manometry which he was unable to expel the balloon catheter spontaneously with paradoxical pushes.  This would be considered pelvic floor dysfunction.  He was recommended pelvic floor therapy, which would at least partially help with his constipation and defecation issues. He is being referred to pelvic floor therapy and is here to discuss this. He is nauseous and hasn't been able to defecate.  Has questions regarding enemas and going forward.

## 2024-10-29 NOTE — HPI-OTHER HISTORIES
5/21/2024:  Priscilla: 42 yo M, retired  with strong family history of colon cancer, presents for follow up after colonoscopy.  Suboptimal prep.  FH of colon cancer.  Sibling had CRC.  Chronic constipation since childhood. Abdominal pain and spasms. Prescribed IBSRela.  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  Has bonita's disease.  However he had stopped his coumadin prior to the procedure.  Usually goes back and forth from [place] and here.  Had multiple questions regarding his colonoscopy.  ========================= 7/21/2023:  Colonoscopy - Suboptimal preparation.  Otherwise no findings.  Recommended repeat colonoscopy in 3 months due to prep.   7/06/2023:  Priscilla 42 yo M with long-standing h/o constipation for which he has seen Dr. Ramírez previously presents for abdominal pain.  Has been on conservative measures, miralax, amitiza, linzess and lactulose when his insurance did not cover linzess.    Was in the [facility] last week.  Was told he may need an endoscopy.    Last colonoscopy was in June 2020 and did not show any concerns other than finding a TA.  Had an EGD at the same time which showed no evidence of H. pylori.  His concerns today are as follows: Severe abdominal pain, cramping with distention and constipation.  BM 1/2 x/week.  Nausea and symptoms x 1 week.  He reports no urge to go.  Brother diagnosed with colon cancer and passed away in the past year, diagnosed at close to death around age 43 years.  Father is currently dying from prostate cancer.  Lactulose does not work and he has been taking amitiza recently. But they have not worked well for him.  Hematochezia 2 weeks ago.  Lost about 12 lbs unintentionally about 2 months ago.  He is regaining that weight.    Last colonoscopy was in 2019 with 5 year f/u if no issues.    + h/o fabry's disease.  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 ================== Previous history from Dr. Tena Ramírez:  38 yo male pt of DR Landrum. Discussed his medical history in detail "I've always been healthy and work out regularly". 5 years ago was in and out of hospital for 2 years and he was diagnosed with all the below in [facility]. He was diagnosed with DM in 2016. His last hba1c was 6.1 last week. He is type 2 diabetic. He is for hip surgery in May and had another hip surgery in 1/2020. He is seeing me today for "stomach issues" on going for the past 3 years. He initially thought it was a part of his diabetes. A few weeks ago, he felt his symptoms had become unbearable. Symptoms are worse in am and at night. In the am, he gets severe cramps in the am and pm/ Because of that he gets hypoglycemic. Says sometimes eating helps it in the evening. But from 6 am to 10.30 am it is severe, he can be hunched over the office in the am. Cramping is mid abdominal. He had an appendectomy 6 years ago and had no problems with that. Cramping wakes him up from sleep in the am. He gets relief from abdominal cramping after passing urine which he says he does frequently "3 times an hour". AFter eating, he usually will have a BM. In the past (until 2015) he had a BM once every 2 weeks. and on a good week 3 times a week. Now he has a BM 3 times a day for the past 5 years. Stools are not hard. Least number of BMs is one a day. There is no blood in stool and no fhx of colon CA. He does not use nsaids. He has determined that cramps have nothing to do with BG levels either high or low.  5/7/20 STill having severe abdominal pain. Wife present today. clarifies today to saw he has had blood in stool. He thinks from straining as "I split my butt crack". First time was a few years ago. Occurence once every couple of months. kUB only showing mild fecal loading. He notes a loosening in stool consistency in the last month. He states he has been checked for a UTI multiple times. Says he has had an elevated wbc "they have never been able to find a cause". He has seen hematology and told nothing to be concerned about. He says it has gone from high to moderate as at last draw. wbc 11/2019 is 6. He is taking pantoprazole "that makes it worse".  5/28/20 Says "I feel great!" SAys as soon as his colonoscopy, he felt great and that his stomach was flat. "it has been protruding for 3 years". Discussed CT/EGD/colonoscopy . 10/7/20 Not doing well " my symptoms are back to where they were before the colonoscopy" He is going through a divorce. Has 2 kids.  Is having a BM only twice a week. Abdominal cramps. "miralax is not working". Dicyclomine helps with cramping.  =========== 2/3/21 Says Amitiza was working bid. Says the first week it gave him diarrhea and cramps. Then that resolved and he was having a BM every morning "like clockwork". Now he is back to where he is still having a BM daily but is still getting stomach cramps. He is taking dicyclomine about qod. This helps the cramping but for a while he was off dicyclomine.  His father has been in hospital now for the past month - first prostate, then colon CA and covid. This has been very stressful  ==================== 3/15/21 KUB 2/2021 showed mild to moderate stool in colon while on Amitiza . Started on Linzess 145 "up and down".  Says he didn't have a BM for 2 days then yesterday "spent 2 hours in the bathroom". When he has a  BM "its like diarrhea'. Says "I dont mind that because I can empty out". Wants to continue to try linzess for now.. He still takes dicyclomine prn for cramping.  STates as a child and over 20 yrs had a  BM once a week or once every 2 weeks.  ============================= 1/26/22 Here for f/u visit SAys linzess works much better than AMitiza but his insurance will not cover it.  He is open to trying Lactulose.  He also has problems with linzess - when he takes it daily he has only small amounts of BMs daily. When he takes qod, he feels emptied out but will have severe cramping. He agrees this is not optimal. ========================== 8/18/22 Here for f.u visit Taking lactulose - once a day - was working at first. Says linzess worked great but says "problem it wasnt  covered by insurance" Then lactulose stopped working even when he doubled the dose.  Will switch back to [employer] in October for better drug coverage  Has increased his water to a gallon a day and if essence will have a BM qod  Has a protruding belly due to constipation. With abd cramping when he does not have a BM .

## 2024-10-30 ENCOUNTER — TELEPHONE ENCOUNTER (OUTPATIENT)
Dept: URBAN - METROPOLITAN AREA CLINIC 19 | Facility: CLINIC | Age: 44
End: 2024-10-30

## 2024-11-05 ENCOUNTER — OFFICE VISIT (OUTPATIENT)
Dept: URBAN - METROPOLITAN AREA CLINIC 19 | Facility: CLINIC | Age: 44
End: 2024-11-05

## 2024-12-17 ENCOUNTER — ERX REFILL RESPONSE (OUTPATIENT)
Dept: URBAN - METROPOLITAN AREA CLINIC 19 | Facility: CLINIC | Age: 44
End: 2024-12-17

## 2024-12-17 RX ORDER — TENAPANOR HYDROCHLORIDE 53.2 MG/1
TAKE 1 TABLET BY MOUTH TWICE A DAY IMMEDIATELY BEFORE MEALS TABLET ORAL
Qty: 60 TABLET | Refills: 5 | OUTPATIENT

## 2025-01-08 ENCOUNTER — OFFICE VISIT (OUTPATIENT)
Dept: URBAN - METROPOLITAN AREA CLINIC 19 | Facility: CLINIC | Age: 45
End: 2025-01-08

## 2025-01-08 RX ORDER — INSULIN ASPART 100 [IU]/ML
INJECT BY SUBCUTANEOUS ROUTE PER PRESCRIBER'S INSTRUCTIONS. INSULIN DOSING REQUIRES INDIVIDUALIZATION INJECTION, SOLUTION INTRAVENOUS; SUBCUTANEOUS
Qty: 1 | Refills: 0 | Status: ACTIVE | COMMUNITY
Start: 1900-01-01

## 2025-01-08 RX ORDER — LACOSAMIDE 200 MG/1
TAKE 1 TABLET (200 MG) BY ORAL ROUTE 2 TIMES PER DAY TABLET, FILM COATED ORAL 2
Qty: 0 | Refills: 0 | Status: ON HOLD | COMMUNITY
Start: 1900-01-01

## 2025-01-08 RX ORDER — LACTULOSE 10 G/10G
1 PACKET AS NEEDED SOLUTION ORAL ONCE A DAY
Status: ACTIVE | COMMUNITY

## 2025-01-08 RX ORDER — CARVEDILOL 12.5 MG/1
1 TABLET WITH FOOD TABLET, FILM COATED ORAL TWICE A DAY
Status: ACTIVE | COMMUNITY

## 2025-01-08 RX ORDER — BISACODYL 10 MG/1
1 SUPPOSITORY AS NEEDED SUPPOSITORY RECTAL ONCE A DAY
Qty: 7 | Refills: 11 | OUTPATIENT

## 2025-01-08 RX ORDER — DICYCLOMINE HYDROCHLORIDE 20 MG/2ML
1 ML INJECTION, SOLUTION INTRAMUSCULAR
Status: ACTIVE | COMMUNITY

## 2025-01-08 RX ORDER — ATORVASTATIN CALCIUM 40 MG/1
1 TABLET TABLET, FILM COATED ORAL ONCE A DAY
Qty: 30 | Status: ACTIVE | COMMUNITY
Start: 2022-08-18

## 2025-01-08 RX ORDER — POLYETHYLENE GLYCOL 3350, SODIUM SULFATE ANHYDROUS, SODIUM BICARBONATE, SODIUM CHLORIDE, POTASSIUM CHLORIDE 236; 22.74; 6.74; 5.86; 2.97 G/4L; G/4L; G/4L; G/4L; G/4L
4000 ML POWDER, FOR SOLUTION ORAL
Qty: 1 KIT | Refills: 0 | Status: ACTIVE | COMMUNITY

## 2025-01-08 RX ORDER — INSULIN DETEMIR 100 [IU]/ML
INJECT BY SUBCUTANEOUS ROUTE PER PRESCRIBER'S INSTRUCTIONS. INSULIN DOSING REQUIRES INDIVIDUALIZATION INJECTION, SOLUTION SUBCUTANEOUS
Qty: 1 | Refills: 0 | Status: ACTIVE | COMMUNITY
Start: 1900-01-01

## 2025-01-08 RX ORDER — BISACODYL 10 MG/1
1 SUPPOSITORY AS NEEDED SUPPOSITORY RECTAL ONCE A DAY
Qty: 7 | Refills: 11 | Status: ACTIVE | COMMUNITY
Start: 2024-10-29 | End: 2025-01-20

## 2025-01-08 RX ORDER — TENAPANOR HYDROCHLORIDE 53.2 MG/1
TAKE 1 TABLET BY MOUTH TWICE A DAY IMMEDIATELY BEFORE MEALS TABLET ORAL
Qty: 60 TABLET | Refills: 5 | Status: ACTIVE | COMMUNITY

## 2025-01-08 RX ORDER — METOPROLOL TARTRATE 25 MG/1
1 TABLET WITH FOOD TABLET, FILM COATED ORAL TWICE A DAY
Status: ACTIVE | COMMUNITY

## 2025-01-08 RX ORDER — EZETIMIBE 10 MG/1
1 TABLET TABLET ORAL ONCE A DAY
Status: ACTIVE | COMMUNITY

## 2025-01-08 RX ORDER — LORAZEPAM 1 MG/1
TABLET ORAL
Qty: 0 | Refills: 0 | Status: ACTIVE | COMMUNITY
Start: 1900-01-01

## 2025-01-08 RX ORDER — SULFAMETHOXAZOLE AND TRIMETHOPRIM 200; 40 MG/5ML; MG/5ML
SUSPENSION ORAL
Qty: 0 | Refills: 0 | Status: ACTIVE | COMMUNITY
Start: 1900-01-01

## 2025-01-08 RX ORDER — LACOSAMIDE 100 MG/1
1 TABLET TABLET, FILM COATED ORAL TWICE A DAY
Status: ACTIVE | COMMUNITY

## 2025-01-08 RX ORDER — PREGABALIN 75 MG/1
CAPSULE ORAL
Qty: 0 | Refills: 0 | Status: ACTIVE | COMMUNITY
Start: 1900-01-01

## 2025-01-08 RX ORDER — WARFARIN 5 MG/1
TABLET ORAL
Qty: 0 | Refills: 0 | Status: ACTIVE | COMMUNITY
Start: 1900-01-01

## 2025-01-08 RX ORDER — QUETIAPINE 100 MG/1
1 TABLET AT BEDTIME TABLET, FILM COATED ORAL ONCE A DAY
Status: ACTIVE | COMMUNITY

## 2025-01-08 RX ORDER — ESCITALOPRAM OXALATE 20 MG/1
1 TABLET TABLET ORAL ONCE A DAY
Status: ACTIVE | COMMUNITY

## 2025-01-08 RX ORDER — INSULIN DEGLUDEC INJECTION 200 U/ML
AS DIRECTED INJECTION, SOLUTION SUBCUTANEOUS
Status: ACTIVE | COMMUNITY

## 2025-01-08 NOTE — HPI-OTHER HISTORIES
10/29/2024:  Priscilla: 43 yo M with chronic long-standing constipation and strong family h/o colon cancer with both father and sibling with colon cancer presents for f/u.  Recently underwent colonoscopy on 8/1/2024.  Prep was extensive due to previously failed prep, and a CVA following coumadin hold for previous colonoscopy. This time we used linzess 290 mcg daily for 1 week beforehand, and a 2-day diabetic prep with golytely with low residue diet prior to colonoscopy.  His BBPS score was 8/9.  He is due for recall surveillance colonoscopy in 3 years in 2027, based on findings and family history. His bowel regimen includes IBSRela 3 tablets every 2 days. He underwent an anorectal manometry which he was unable to expel the balloon catheter spontaneously with paradoxical pushes.  This would be considered pelvic floor dysfunction.  He was recommended pelvic floor therapy, which would at least partially help with his constipation and defecation issues. He is being referred to pelvic floor therapy and is here to discuss this. He is nauseous and hasn't been able to defecate.  Has questions regarding enemas and going forward. ============ 5/21/2024:  Priscilla: 42 yo M, retired  with strong family history of colon cancer, presents for follow up after colonoscopy.  Suboptimal prep.  FH of colon cancer.  Sibling had CRC.  Chronic constipation since childhood. Abdominal pain and spasms. Prescribed IBSRela.  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  Has bonita's disease.  However he had stopped his coumadin prior to the procedure.  Usually goes back and forth from [place] and here.  Had multiple questions regarding his colonoscopy.  ========================= 7/21/2023:  Colonoscopy - Suboptimal preparation.  Otherwise no findings.  Recommended repeat colonoscopy in 3 months due to prep.   7/06/2023:  Priscilla 42 yo M with long-standing h/o constipation for which he has seen Dr. Ramírez previously presents for abdominal pain.  Has been on conservative measures, miralax, amitiza, linzess and lactulose when his insurance did not cover linzess.    Was in the [facility] last week.  Was told he may need an endoscopy.    Last colonoscopy was in June 2020 and did not show any concerns other than finding a TA.  Had an EGD at the same time which showed no evidence of H. pylori.  His concerns today are as follows: Severe abdominal pain, cramping with distention and constipation.  BM 1/2 x/week.  Nausea and symptoms x 1 week.  He reports no urge to go.  Brother diagnosed with colon cancer and passed away in the past year, diagnosed at close to death around age 43 years.  Father is currently dying from prostate cancer.  Lactulose does not work and he has been taking amitiza recently. But they have not worked well for him.  Hematochezia 2 weeks ago.  Lost about 12 lbs unintentionally about 2 months ago.  He is regaining that weight.    Last colonoscopy was in 2019 with 5 year f/u if no issues.    + h/o fabry's disease.  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 ================== Previous history from Dr. Tean Ramírez:  38 yo male pt of DR Landrum. Discussed his medical history in detail "I've always been healthy and work out regularly". 5 years ago was in and out of hospital for 2 years and he was diagnosed with all the below in [facility]. He was diagnosed with DM in 2016. His last hba1c was 6.1 last week. He is type 2 diabetic. He is for hip surgery in May and had another hip surgery in 1/2020. He is seeing me today for "stomach issues" on going for the past 3 years. He initially thought it was a part of his diabetes. A few weeks ago, he felt his symptoms had become unbearable. Symptoms are worse in am and at night. In the am, he gets severe cramps in the am and pm/ Because of that he gets hypoglycemic. Says sometimes eating helps it in the evening. But from 6 am to 10.30 am it is severe, he can be hunched over the office in the am. Cramping is mid abdominal. He had an appendectomy 6 years ago and had no problems with that. Cramping wakes him up from sleep in the am. He gets relief from abdominal cramping after passing urine which he says he does frequently "3 times an hour". AFter eating, he usually will have a BM. In the past (until 2015) he had a BM once every 2 weeks. and on a good week 3 times a week. Now he has a BM 3 times a day for the past 5 years. Stools are not hard. Least number of BMs is one a day. There is no blood in stool and no fhx of colon CA. He does not use nsaids. He has determined that cramps have nothing to do with BG levels either high or low.  5/7/20 STill having severe abdominal pain. Wife present today. clarifies today to saw he has had blood in stool. He thinks from straining as "I split my butt crack". First time was a few years ago. Occurence once every couple of months. kUB only showing mild fecal loading. He notes a loosening in stool consistency in the last month. He states he has been checked for a UTI multiple times. Says he has had an elevated wbc "they have never been able to find a cause". He has seen hematology and told nothing to be concerned about. He says it has gone from high to moderate as at last draw. wbc 11/2019 is 6. He is taking pantoprazole "that makes it worse".  5/28/20 Says "I feel great!" SAys as soon as his colonoscopy, he felt great and that his stomach was flat. "it has been protruding for 3 years". Discussed CT/EGD/colonoscopy . 10/7/20 Not doing well " my symptoms are back to where they were before the colonoscopy" He is going through a divorce. Has 2 kids.  Is having a BM only twice a week. Abdominal cramps. "miralax is not working". Dicyclomine helps with cramping.  =========== 2/3/21 Says Boo was working bid. Says the first week it gave him diarrhea and cramps. Then that resolved and he was having a BM every morning "like clockwork". Now he is back to where he is still having a BM daily but is still getting stomach cramps. He is taking dicyclomine about qod. This helps the cramping but for a while he was off dicyclomine.  His father has been in hospital now for the past month - first prostate, then colon CA and covid. This has been very stressful  ==================== 3/15/21 KUB 2/2021 showed mild to moderate stool in colon while on Amitiza . Started on Linzess 145 "up and down".  Says he didn't have a BM for 2 days then yesterday "spent 2 hours in the bathroom". When he has a  BM "its like diarrhea'. Says "I dont mind that because I can empty out". Wants to continue to try linzess for now.. He still takes dicyclomine prn for cramping.  STates as a child and over 20 yrs had a  BM once a week or once every 2 weeks.  ============================= 1/26/22 Here for f/u visit SAys linzess works much better than AMitiza but his insurance will not cover it.  He is open to trying Lactulose.  He also has problems with linzess - when he takes it daily he has only small amounts of BMs daily. When he takes qod, he feels emptied out but will have severe cramping. He agrees this is not optimal. ========================== 8/18/22 Here for f.u visit Taking lactulose - once a day - was working at first. Says linzess worked great but says "problem it wasnt  covered by insurance" Then lactulose stopped working even when he doubled the dose.  Will switch back to [employer] in October for better drug coverage  Has increased his water to a gallon a day and if essence will have a BM qod  Has a protruding belly due to constipation. With abd cramping when he does not have a BM .

## 2025-01-08 NOTE — HPI-TODAY'S VISIT:
1/8/2024:  Priscilla: 45 yo M with chronic constipation and strong FH of colon cancer in father and brother presents for f/u.  Other than CIC, he also has been diagnosed with pelvic floor dyssynergia and recommended pelvic floor therapy.  He was also referred to colo-rectal surgeon and has seen Dr. Toño Kauffman.  Multiple options were discussed including sitz marker studies as first step in addition to pelvic floor therapy prior to pursuing surgical options.  Surgical options for presumed colonic inertia included total colectomy with ileorectal anastomosis given his fh of colon cancer and ileostomy with total colectomy - based on my understanding of the notes.

## 2025-01-29 ENCOUNTER — OFFICE VISIT (OUTPATIENT)
Dept: URBAN - METROPOLITAN AREA CLINIC 19 | Facility: CLINIC | Age: 45
End: 2025-01-29

## 2025-06-30 ENCOUNTER — TELEPHONE ENCOUNTER (OUTPATIENT)
Dept: URBAN - METROPOLITAN AREA CLINIC 19 | Facility: CLINIC | Age: 45
End: 2025-06-30

## 2025-06-30 RX ORDER — TENAPANOR HYDROCHLORIDE 53.2 MG/1
TAKE 1 TABLET BY MOUTH TWICE A DAY IMMEDIATELY BEFORE MEALS TABLET ORAL
Qty: 60 TABLET | Refills: 5

## 2025-07-07 ENCOUNTER — TELEPHONE ENCOUNTER (OUTPATIENT)
Dept: URBAN - METROPOLITAN AREA CLINIC 19 | Facility: CLINIC | Age: 45
End: 2025-07-07